# Patient Record
Sex: FEMALE | Race: WHITE | NOT HISPANIC OR LATINO
[De-identification: names, ages, dates, MRNs, and addresses within clinical notes are randomized per-mention and may not be internally consistent; named-entity substitution may affect disease eponyms.]

---

## 2017-01-13 ENCOUNTER — APPOINTMENT (OUTPATIENT)
Dept: HOME HEALTH SERVICES | Facility: HOME HEALTH | Age: 82
End: 2017-01-13

## 2017-01-24 ENCOUNTER — LABORATORY RESULT (OUTPATIENT)
Age: 82
End: 2017-01-24

## 2017-02-06 ENCOUNTER — MEDICATION RENEWAL (OUTPATIENT)
Age: 82
End: 2017-02-06

## 2017-02-08 ENCOUNTER — OTHER (OUTPATIENT)
Age: 82
End: 2017-02-08

## 2017-02-08 ENCOUNTER — CLINICAL ADVICE (OUTPATIENT)
Age: 82
End: 2017-02-08

## 2017-02-23 ENCOUNTER — APPOINTMENT (OUTPATIENT)
Dept: HOME HEALTH SERVICES | Facility: HOME HEALTH | Age: 82
End: 2017-02-23

## 2017-02-27 ENCOUNTER — OTHER (OUTPATIENT)
Age: 82
End: 2017-02-27

## 2017-02-27 ENCOUNTER — LABORATORY RESULT (OUTPATIENT)
Age: 82
End: 2017-02-27

## 2017-02-27 ENCOUNTER — APPOINTMENT (OUTPATIENT)
Dept: HOME HEALTH SERVICES | Facility: HOME HEALTH | Age: 82
End: 2017-02-27

## 2017-02-27 VITALS
OXYGEN SATURATION: 96 % | TEMPERATURE: 96.9 F | DIASTOLIC BLOOD PRESSURE: 80 MMHG | HEART RATE: 87 BPM | SYSTOLIC BLOOD PRESSURE: 120 MMHG | RESPIRATION RATE: 17 BRPM

## 2017-03-23 ENCOUNTER — RX RENEWAL (OUTPATIENT)
Age: 82
End: 2017-03-23

## 2017-03-27 ENCOUNTER — APPOINTMENT (OUTPATIENT)
Dept: HOME HEALTH SERVICES | Facility: HOME HEALTH | Age: 82
End: 2017-03-27

## 2017-03-27 LAB
INR PPP: 2.23 RATIO
PT BLD: 25.6 SEC

## 2017-04-25 LAB
INR PPP: 2.31 RATIO
PT BLD: 26.5 SEC

## 2017-05-08 ENCOUNTER — APPOINTMENT (OUTPATIENT)
Dept: HOME HEALTH SERVICES | Facility: HOME HEALTH | Age: 82
End: 2017-05-08
Payer: MEDICARE

## 2017-05-08 VITALS
DIASTOLIC BLOOD PRESSURE: 70 MMHG | TEMPERATURE: 97.1 F | SYSTOLIC BLOOD PRESSURE: 110 MMHG | HEART RATE: 71 BPM | RESPIRATION RATE: 18 BRPM | OXYGEN SATURATION: 97 %

## 2017-05-08 DIAGNOSIS — Z71.89 OTHER SPECIFIED COUNSELING: ICD-10-CM

## 2017-05-08 DIAGNOSIS — Z87.39 PERSONAL HISTORY OF OTHER DISEASES OF THE MUSCULOSKELETAL SYSTEM AND CONNECTIVE TISSUE: ICD-10-CM

## 2017-05-08 DIAGNOSIS — R10.9 UNSPECIFIED ABDOMINAL PAIN: ICD-10-CM

## 2017-05-08 PROCEDURE — 99350 HOME/RES VST EST HIGH MDM 60: CPT | Mod: GV

## 2017-05-08 RX ORDER — OXYCODONE AND ACETAMINOPHEN 5; 325 MG/1; MG/1
5-325 TABLET ORAL
Qty: 40 | Refills: 0 | Status: COMPLETED | COMMUNITY
Start: 2017-02-06 | End: 2017-05-08

## 2017-05-19 ENCOUNTER — APPOINTMENT (OUTPATIENT)
Dept: CT IMAGING | Facility: CLINIC | Age: 82
End: 2017-05-19

## 2017-05-19 ENCOUNTER — OUTPATIENT (OUTPATIENT)
Dept: OUTPATIENT SERVICES | Facility: HOSPITAL | Age: 82
LOS: 1 days | End: 2017-05-19
Payer: COMMERCIAL

## 2017-05-19 DIAGNOSIS — Z00.8 ENCOUNTER FOR OTHER GENERAL EXAMINATION: ICD-10-CM

## 2017-05-19 PROCEDURE — 71260 CT THORAX DX C+: CPT

## 2017-05-19 PROCEDURE — 74177 CT ABD & PELVIS W/CONTRAST: CPT

## 2017-05-23 ENCOUNTER — APPOINTMENT (OUTPATIENT)
Dept: HOME HEALTH SERVICES | Facility: HOME HEALTH | Age: 82
End: 2017-05-23

## 2017-05-25 ENCOUNTER — RX RENEWAL (OUTPATIENT)
Age: 82
End: 2017-05-25

## 2017-05-26 LAB
INR PPP: 1.99 RATIO
PT BLD: 22.8 SEC

## 2017-06-23 LAB
INR PPP: 1.9 RATIO
PT BLD: 21.8 SEC

## 2017-07-05 ENCOUNTER — OTHER (OUTPATIENT)
Age: 82
End: 2017-07-05

## 2017-07-18 ENCOUNTER — APPOINTMENT (OUTPATIENT)
Dept: HOME HEALTH SERVICES | Facility: HOME HEALTH | Age: 82
End: 2017-07-18
Payer: MEDICARE

## 2017-07-18 VITALS
HEART RATE: 72 BPM | OXYGEN SATURATION: 97 % | RESPIRATION RATE: 16 BRPM | SYSTOLIC BLOOD PRESSURE: 110 MMHG | TEMPERATURE: 99 F | DIASTOLIC BLOOD PRESSURE: 70 MMHG

## 2017-07-18 DIAGNOSIS — Z87.891 PERSONAL HISTORY OF NICOTINE DEPENDENCE: ICD-10-CM

## 2017-07-18 DIAGNOSIS — E55.9 VITAMIN D DEFICIENCY, UNSPECIFIED: ICD-10-CM

## 2017-07-18 DIAGNOSIS — Z78.9 OTHER SPECIFIED HEALTH STATUS: ICD-10-CM

## 2017-07-18 DIAGNOSIS — D49.1 NEOPLASM OF UNSPECIFIED BEHAVIOR OF RESPIRATORY SYSTEM: ICD-10-CM

## 2017-07-18 DIAGNOSIS — Z51.5 ENCOUNTER FOR PALLIATIVE CARE: ICD-10-CM

## 2017-07-18 PROCEDURE — 99350 HOME/RES VST EST HIGH MDM 60: CPT | Mod: GV

## 2017-07-19 LAB
INR PPP: 2.15 RATIO
PT BLD: 24.7 SEC

## 2017-07-21 ENCOUNTER — RX RENEWAL (OUTPATIENT)
Age: 82
End: 2017-07-21

## 2017-08-18 ENCOUNTER — RX RENEWAL (OUTPATIENT)
Age: 82
End: 2017-08-18

## 2017-08-22 ENCOUNTER — LABORATORY RESULT (OUTPATIENT)
Age: 82
End: 2017-08-22

## 2017-08-23 ENCOUNTER — CLINICAL ADVICE (OUTPATIENT)
Age: 82
End: 2017-08-23

## 2017-08-24 ENCOUNTER — RX RENEWAL (OUTPATIENT)
Age: 82
End: 2017-08-24

## 2017-09-14 ENCOUNTER — RX RENEWAL (OUTPATIENT)
Age: 82
End: 2017-09-14

## 2017-09-19 LAB
INR PPP: 1.74 RATIO
PT BLD: 19.9 SEC

## 2017-09-27 ENCOUNTER — APPOINTMENT (OUTPATIENT)
Dept: HOME HEALTH SERVICES | Facility: HOME HEALTH | Age: 82
End: 2017-09-27

## 2017-09-29 ENCOUNTER — APPOINTMENT (OUTPATIENT)
Dept: HOME HEALTH SERVICES | Facility: HOME HEALTH | Age: 82
End: 2017-09-29
Payer: MEDICARE

## 2017-09-29 VITALS
HEART RATE: 80 BPM | SYSTOLIC BLOOD PRESSURE: 135 MMHG | DIASTOLIC BLOOD PRESSURE: 80 MMHG | RESPIRATION RATE: 16 BRPM | OXYGEN SATURATION: 96 %

## 2017-09-29 PROCEDURE — 99350 HOME/RES VST EST HIGH MDM 60: CPT

## 2017-10-03 ENCOUNTER — MOBILE ON CALL (OUTPATIENT)
Age: 82
End: 2017-10-03

## 2017-10-17 ENCOUNTER — RX RENEWAL (OUTPATIENT)
Age: 82
End: 2017-10-17

## 2017-10-18 ENCOUNTER — RX RENEWAL (OUTPATIENT)
Age: 82
End: 2017-10-18

## 2017-11-24 ENCOUNTER — CLINICAL ADVICE (OUTPATIENT)
Age: 82
End: 2017-11-24

## 2017-11-29 ENCOUNTER — APPOINTMENT (OUTPATIENT)
Dept: HOME HEALTH SERVICES | Facility: HOME HEALTH | Age: 82
End: 2017-11-29
Payer: MEDICARE

## 2017-11-29 VITALS
OXYGEN SATURATION: 97 % | RESPIRATION RATE: 20 BRPM | DIASTOLIC BLOOD PRESSURE: 70 MMHG | SYSTOLIC BLOOD PRESSURE: 120 MMHG | HEART RATE: 77 BPM | TEMPERATURE: 98.8 F

## 2017-11-29 PROCEDURE — G0008: CPT

## 2017-11-29 PROCEDURE — 99350 HOME/RES VST EST HIGH MDM 60: CPT | Mod: 25

## 2017-11-29 PROCEDURE — 90662 IIV NO PRSV INCREASED AG IM: CPT

## 2017-12-07 ENCOUNTER — CLINICAL ADVICE (OUTPATIENT)
Age: 82
End: 2017-12-07

## 2017-12-07 LAB
ALBUMIN SERPL ELPH-MCNC: 3.6 G/DL
ANION GAP SERPL CALC-SCNC: 13 MMOL/L
BUN SERPL-MCNC: 19 MG/DL
CALCIUM SERPL-MCNC: 9.9 MG/DL
CHLORIDE SERPL-SCNC: 103 MMOL/L
CO2 SERPL-SCNC: 30 MMOL/L
CREAT SERPL-MCNC: 0.73 MG/DL
GLUCOSE SERPL-MCNC: 95 MG/DL
INR PPP: 1.75 RATIO
NT-PROBNP SERPL-MCNC: 2554 PG/ML
PHOSPHATE SERPL-MCNC: 3 MG/DL
POTASSIUM SERPL-SCNC: 4.3 MMOL/L
PT BLD: 20 SEC
SODIUM SERPL-SCNC: 146 MMOL/L
TSH SERPL-ACNC: 0.24 UIU/ML
VIT B12 SERPL-MCNC: 239 PG/ML

## 2018-01-01 ENCOUNTER — OUTPATIENT (OUTPATIENT)
Dept: OUTPATIENT SERVICES | Facility: HOSPITAL | Age: 83
LOS: 1 days | End: 2018-01-01
Payer: MEDICAID

## 2018-01-01 PROCEDURE — G9001: CPT

## 2018-01-20 ENCOUNTER — CLINICAL ADVICE (OUTPATIENT)
Age: 83
End: 2018-01-20

## 2018-01-20 ENCOUNTER — INPATIENT (INPATIENT)
Facility: HOSPITAL | Age: 83
LOS: 2 days | Discharge: HOME HEALTH | End: 2018-01-23
Attending: HOSPITALIST | Admitting: HOSPITALIST
Payer: MEDICARE

## 2018-01-20 VITALS
RESPIRATION RATE: 27 BRPM | DIASTOLIC BLOOD PRESSURE: 37 MMHG | HEART RATE: 105 BPM | OXYGEN SATURATION: 100 % | SYSTOLIC BLOOD PRESSURE: 137 MMHG | TEMPERATURE: 102 F

## 2018-01-20 DIAGNOSIS — A41.9 SEPSIS, UNSPECIFIED ORGANISM: ICD-10-CM

## 2018-01-20 DIAGNOSIS — I10 ESSENTIAL (PRIMARY) HYPERTENSION: ICD-10-CM

## 2018-01-20 DIAGNOSIS — J32.9 CHRONIC SINUSITIS, UNSPECIFIED: ICD-10-CM

## 2018-01-20 DIAGNOSIS — I48.2 CHRONIC ATRIAL FIBRILLATION: ICD-10-CM

## 2018-01-20 DIAGNOSIS — I50.9 HEART FAILURE, UNSPECIFIED: ICD-10-CM

## 2018-01-20 DIAGNOSIS — J42 UNSPECIFIED CHRONIC BRONCHITIS: ICD-10-CM

## 2018-01-20 DIAGNOSIS — E03.9 HYPOTHYROIDISM, UNSPECIFIED: ICD-10-CM

## 2018-01-20 DIAGNOSIS — B34.9 VIRAL INFECTION, UNSPECIFIED: ICD-10-CM

## 2018-01-20 LAB
ALBUMIN SERPL ELPH-MCNC: 3.8 G/DL — SIGNIFICANT CHANGE UP (ref 3.3–5)
ALP SERPL-CCNC: 60 U/L — SIGNIFICANT CHANGE UP (ref 40–120)
ALT FLD-CCNC: 8 U/L — SIGNIFICANT CHANGE UP (ref 4–33)
APPEARANCE UR: CLEAR — SIGNIFICANT CHANGE UP
APTT BLD: 34.4 SEC — SIGNIFICANT CHANGE UP (ref 27.5–37.4)
AST SERPL-CCNC: 15 U/L — SIGNIFICANT CHANGE UP (ref 4–32)
B PERT DNA SPEC QL NAA+PROBE: SIGNIFICANT CHANGE UP
BASE EXCESS BLDV CALC-SCNC: 10 MMOL/L — SIGNIFICANT CHANGE UP
BASOPHILS # BLD AUTO: 0.05 K/UL — SIGNIFICANT CHANGE UP (ref 0–0.2)
BASOPHILS NFR BLD AUTO: 0.5 % — SIGNIFICANT CHANGE UP (ref 0–2)
BILIRUB SERPL-MCNC: 0.7 MG/DL — SIGNIFICANT CHANGE UP (ref 0.2–1.2)
BILIRUB UR-MCNC: NEGATIVE — SIGNIFICANT CHANGE UP
BLOOD GAS VENOUS - CREATININE: 0.67 MG/DL — SIGNIFICANT CHANGE UP (ref 0.5–1.3)
BLOOD UR QL VISUAL: HIGH
BUN SERPL-MCNC: 20 MG/DL — SIGNIFICANT CHANGE UP (ref 7–23)
C PNEUM DNA SPEC QL NAA+PROBE: NOT DETECTED — SIGNIFICANT CHANGE UP
CALCIUM SERPL-MCNC: 9 MG/DL — SIGNIFICANT CHANGE UP (ref 8.4–10.5)
CHLORIDE BLDV-SCNC: 104 MMOL/L — SIGNIFICANT CHANGE UP (ref 96–108)
CHLORIDE SERPL-SCNC: 101 MMOL/L — SIGNIFICANT CHANGE UP (ref 98–107)
CK MB BLD-MCNC: 1.71 NG/ML — SIGNIFICANT CHANGE UP (ref 1–4.7)
CK SERPL-CCNC: 40 U/L — SIGNIFICANT CHANGE UP (ref 25–170)
CO2 SERPL-SCNC: 32 MMOL/L — HIGH (ref 22–31)
COLOR SPEC: SIGNIFICANT CHANGE UP
CREAT SERPL-MCNC: 0.73 MG/DL — SIGNIFICANT CHANGE UP (ref 0.5–1.3)
DIGOXIN SERPL-MCNC: < 0.3 NG/ML — LOW (ref 0.8–2)
EOSINOPHIL # BLD AUTO: 0.03 K/UL — SIGNIFICANT CHANGE UP (ref 0–0.5)
EOSINOPHIL NFR BLD AUTO: 0.3 % — SIGNIFICANT CHANGE UP (ref 0–6)
FLUAV H1 2009 PAND RNA SPEC QL NAA+PROBE: NOT DETECTED — SIGNIFICANT CHANGE UP
FLUAV H1 RNA SPEC QL NAA+PROBE: NOT DETECTED — SIGNIFICANT CHANGE UP
FLUAV H3 RNA SPEC QL NAA+PROBE: NOT DETECTED — SIGNIFICANT CHANGE UP
FLUAV SUBTYP SPEC NAA+PROBE: SIGNIFICANT CHANGE UP
FLUBV RNA SPEC QL NAA+PROBE: NOT DETECTED — SIGNIFICANT CHANGE UP
GAS PNL BLDV: 134 MMOL/L — LOW (ref 136–146)
GLUCOSE BLDV-MCNC: 112 — HIGH (ref 70–99)
GLUCOSE SERPL-MCNC: 105 MG/DL — HIGH (ref 70–99)
GLUCOSE UR-MCNC: NEGATIVE — SIGNIFICANT CHANGE UP
HADV DNA SPEC QL NAA+PROBE: NOT DETECTED — SIGNIFICANT CHANGE UP
HCO3 BLDV-SCNC: 31 MMOL/L — HIGH (ref 20–27)
HCOV 229E RNA SPEC QL NAA+PROBE: NOT DETECTED — SIGNIFICANT CHANGE UP
HCOV HKU1 RNA SPEC QL NAA+PROBE: NOT DETECTED — SIGNIFICANT CHANGE UP
HCOV NL63 RNA SPEC QL NAA+PROBE: NOT DETECTED — SIGNIFICANT CHANGE UP
HCOV OC43 RNA SPEC QL NAA+PROBE: NOT DETECTED — SIGNIFICANT CHANGE UP
HCT VFR BLD CALC: 39.8 % — SIGNIFICANT CHANGE UP (ref 34.5–45)
HCT VFR BLDV CALC: 39.4 % — SIGNIFICANT CHANGE UP (ref 34.5–45)
HGB BLD-MCNC: 12.7 G/DL — SIGNIFICANT CHANGE UP (ref 11.5–15.5)
HGB BLDV-MCNC: 12.8 G/DL — SIGNIFICANT CHANGE UP (ref 11.5–15.5)
HMPV RNA SPEC QL NAA+PROBE: NOT DETECTED — SIGNIFICANT CHANGE UP
HPIV1 RNA SPEC QL NAA+PROBE: NOT DETECTED — SIGNIFICANT CHANGE UP
HPIV2 RNA SPEC QL NAA+PROBE: NOT DETECTED — SIGNIFICANT CHANGE UP
HPIV3 RNA SPEC QL NAA+PROBE: NOT DETECTED — SIGNIFICANT CHANGE UP
HPIV4 RNA SPEC QL NAA+PROBE: NOT DETECTED — SIGNIFICANT CHANGE UP
HYALINE CASTS # UR AUTO: SIGNIFICANT CHANGE UP (ref 0–?)
IMM GRANULOCYTES # BLD AUTO: 0.03 # — SIGNIFICANT CHANGE UP
IMM GRANULOCYTES NFR BLD AUTO: 0.3 % — SIGNIFICANT CHANGE UP (ref 0–1.5)
INR BLD: 1.84 — HIGH (ref 0.88–1.17)
KETONES UR-MCNC: NEGATIVE — SIGNIFICANT CHANGE UP
LACTATE BLDV-MCNC: 1.5 MMOL/L — SIGNIFICANT CHANGE UP (ref 0.5–2)
LEUKOCYTE ESTERASE UR-ACNC: HIGH
LYMPHOCYTES # BLD AUTO: 0.56 K/UL — LOW (ref 1–3.3)
LYMPHOCYTES # BLD AUTO: 6.1 % — LOW (ref 13–44)
M PNEUMO DNA SPEC QL NAA+PROBE: NOT DETECTED — SIGNIFICANT CHANGE UP
MCHC RBC-ENTMCNC: 30.1 PG — SIGNIFICANT CHANGE UP (ref 27–34)
MCHC RBC-ENTMCNC: 31.9 % — LOW (ref 32–36)
MCV RBC AUTO: 94.3 FL — SIGNIFICANT CHANGE UP (ref 80–100)
MONOCYTES # BLD AUTO: 0.44 K/UL — SIGNIFICANT CHANGE UP (ref 0–0.9)
MONOCYTES NFR BLD AUTO: 4.8 % — SIGNIFICANT CHANGE UP (ref 2–14)
NEUTROPHILS # BLD AUTO: 8.03 K/UL — HIGH (ref 1.8–7.4)
NEUTROPHILS NFR BLD AUTO: 88 % — HIGH (ref 43–77)
NITRITE UR-MCNC: NEGATIVE — SIGNIFICANT CHANGE UP
NRBC # FLD: 0 — SIGNIFICANT CHANGE UP
PCO2 BLDV: 61 MMHG — HIGH (ref 41–51)
PH BLDV: 7.38 PH — SIGNIFICANT CHANGE UP (ref 7.32–7.43)
PH UR: 5.5 — SIGNIFICANT CHANGE UP (ref 4.6–8)
PLATELET # BLD AUTO: 206 K/UL — SIGNIFICANT CHANGE UP (ref 150–400)
PMV BLD: 9.8 FL — SIGNIFICANT CHANGE UP (ref 7–13)
PO2 BLDV: 28 MMHG — LOW (ref 35–40)
POTASSIUM BLDV-SCNC: 5.9 MMOL/L — HIGH (ref 3.4–4.5)
POTASSIUM SERPL-MCNC: 3.9 MMOL/L — SIGNIFICANT CHANGE UP (ref 3.5–5.3)
POTASSIUM SERPL-SCNC: 3.9 MMOL/L — SIGNIFICANT CHANGE UP (ref 3.5–5.3)
PROT SERPL-MCNC: 7.1 G/DL — SIGNIFICANT CHANGE UP (ref 6–8.3)
PROT UR-MCNC: NEGATIVE MG/DL — SIGNIFICANT CHANGE UP
PROTHROM AB SERPL-ACNC: 20.7 SEC — HIGH (ref 9.8–13.1)
RBC # BLD: 4.22 M/UL — SIGNIFICANT CHANGE UP (ref 3.8–5.2)
RBC # FLD: 13.6 % — SIGNIFICANT CHANGE UP (ref 10.3–14.5)
RBC CASTS # UR COMP ASSIST: SIGNIFICANT CHANGE UP (ref 0–?)
RSV RNA SPEC QL NAA+PROBE: NOT DETECTED — SIGNIFICANT CHANGE UP
RV+EV RNA SPEC QL NAA+PROBE: NOT DETECTED — SIGNIFICANT CHANGE UP
SAO2 % BLDV: 43.6 % — LOW (ref 60–85)
SODIUM SERPL-SCNC: 144 MMOL/L — SIGNIFICANT CHANGE UP (ref 135–145)
SP GR SPEC: 1.01 — SIGNIFICANT CHANGE UP (ref 1–1.04)
SQUAMOUS # UR AUTO: SIGNIFICANT CHANGE UP
TROPONIN T SERPL-MCNC: < 0.06 NG/ML — SIGNIFICANT CHANGE UP (ref 0–0.06)
UROBILINOGEN FLD QL: NORMAL MG/DL — SIGNIFICANT CHANGE UP
WBC # BLD: 9.14 K/UL — SIGNIFICANT CHANGE UP (ref 3.8–10.5)
WBC # FLD AUTO: 9.14 K/UL — SIGNIFICANT CHANGE UP (ref 3.8–10.5)
WBC UR QL: SIGNIFICANT CHANGE UP (ref 0–?)

## 2018-01-20 PROCEDURE — 99223 1ST HOSP IP/OBS HIGH 75: CPT

## 2018-01-20 PROCEDURE — 71045 X-RAY EXAM CHEST 1 VIEW: CPT | Mod: 26

## 2018-01-20 RX ORDER — ACETAMINOPHEN 500 MG
650 TABLET ORAL EVERY 6 HOURS
Qty: 0 | Refills: 0 | Status: DISCONTINUED | OUTPATIENT
Start: 2018-01-20 | End: 2018-01-23

## 2018-01-20 RX ORDER — IPRATROPIUM/ALBUTEROL SULFATE 18-103MCG
3 AEROSOL WITH ADAPTER (GRAM) INHALATION EVERY 6 HOURS
Qty: 0 | Refills: 0 | Status: DISCONTINUED | OUTPATIENT
Start: 2018-01-20 | End: 2018-01-23

## 2018-01-20 RX ORDER — WARFARIN SODIUM 2.5 MG/1
1 TABLET ORAL
Qty: 0 | Refills: 0 | COMMUNITY

## 2018-01-20 RX ORDER — ATENOLOL 25 MG/1
50 TABLET ORAL DAILY
Qty: 0 | Refills: 0 | Status: DISCONTINUED | OUTPATIENT
Start: 2018-01-20 | End: 2018-01-23

## 2018-01-20 RX ORDER — FUROSEMIDE 40 MG
20 TABLET ORAL DAILY
Qty: 0 | Refills: 0 | Status: DISCONTINUED | OUTPATIENT
Start: 2018-01-20 | End: 2018-01-23

## 2018-01-20 RX ORDER — SODIUM CHLORIDE 9 MG/ML
1000 INJECTION INTRAMUSCULAR; INTRAVENOUS; SUBCUTANEOUS ONCE
Qty: 0 | Refills: 0 | Status: COMPLETED | OUTPATIENT
Start: 2018-01-20 | End: 2018-01-20

## 2018-01-20 RX ORDER — AZITHROMYCIN 500 MG/1
500 TABLET, FILM COATED ORAL ONCE
Qty: 0 | Refills: 0 | Status: COMPLETED | OUTPATIENT
Start: 2018-01-21 | End: 2018-01-21

## 2018-01-20 RX ORDER — FLUTICASONE PROPIONATE 50 MCG
2 SPRAY, SUSPENSION NASAL DAILY
Qty: 0 | Refills: 0 | Status: DISCONTINUED | OUTPATIENT
Start: 2018-01-20 | End: 2018-01-23

## 2018-01-20 RX ORDER — PANTOPRAZOLE SODIUM 20 MG/1
40 TABLET, DELAYED RELEASE ORAL
Qty: 0 | Refills: 0 | Status: DISCONTINUED | OUTPATIENT
Start: 2018-01-20 | End: 2018-01-23

## 2018-01-20 RX ORDER — CEFTRIAXONE 500 MG/1
1 INJECTION, POWDER, FOR SOLUTION INTRAMUSCULAR; INTRAVENOUS ONCE
Qty: 0 | Refills: 0 | Status: COMPLETED | OUTPATIENT
Start: 2018-01-20 | End: 2018-01-20

## 2018-01-20 RX ORDER — ACETAMINOPHEN 500 MG
1000 TABLET ORAL ONCE
Qty: 0 | Refills: 0 | Status: COMPLETED | OUTPATIENT
Start: 2018-01-20 | End: 2018-01-20

## 2018-01-20 RX ORDER — TIOTROPIUM BROMIDE 18 UG/1
1 CAPSULE ORAL; RESPIRATORY (INHALATION) DAILY
Qty: 0 | Refills: 0 | Status: DISCONTINUED | OUTPATIENT
Start: 2018-01-20 | End: 2018-01-23

## 2018-01-20 RX ORDER — LEVOTHYROXINE SODIUM 125 MCG
112 TABLET ORAL DAILY
Qty: 0 | Refills: 0 | Status: DISCONTINUED | OUTPATIENT
Start: 2018-01-20 | End: 2018-01-23

## 2018-01-20 RX ORDER — INFLUENZA VIRUS VACCINE 15; 15; 15; 15 UG/.5ML; UG/.5ML; UG/.5ML; UG/.5ML
0.5 SUSPENSION INTRAMUSCULAR ONCE
Qty: 0 | Refills: 0 | Status: COMPLETED | OUTPATIENT
Start: 2018-01-20 | End: 2018-01-22

## 2018-01-20 RX ORDER — CEFTRIAXONE 500 MG/1
1 INJECTION, POWDER, FOR SOLUTION INTRAMUSCULAR; INTRAVENOUS ONCE
Qty: 0 | Refills: 0 | Status: COMPLETED | OUTPATIENT
Start: 2018-01-21 | End: 2018-01-21

## 2018-01-20 RX ORDER — PREGABALIN 225 MG/1
1000 CAPSULE ORAL DAILY
Qty: 0 | Refills: 0 | Status: DISCONTINUED | OUTPATIENT
Start: 2018-01-20 | End: 2018-01-23

## 2018-01-20 RX ORDER — IPRATROPIUM/ALBUTEROL SULFATE 18-103MCG
3 AEROSOL WITH ADAPTER (GRAM) INHALATION ONCE
Qty: 0 | Refills: 0 | Status: COMPLETED | OUTPATIENT
Start: 2018-01-20 | End: 2018-01-20

## 2018-01-20 RX ORDER — AZITHROMYCIN 500 MG/1
500 TABLET, FILM COATED ORAL ONCE
Qty: 0 | Refills: 0 | Status: COMPLETED | OUTPATIENT
Start: 2018-01-20 | End: 2018-01-20

## 2018-01-20 RX ADMIN — AZITHROMYCIN 250 MILLIGRAM(S): 500 TABLET, FILM COATED ORAL at 16:00

## 2018-01-20 RX ADMIN — Medication 40 MILLIGRAM(S): at 16:06

## 2018-01-20 RX ADMIN — Medication 400 MILLIGRAM(S): at 15:05

## 2018-01-20 RX ADMIN — CEFTRIAXONE 100 GRAM(S): 500 INJECTION, POWDER, FOR SOLUTION INTRAMUSCULAR; INTRAVENOUS at 17:47

## 2018-01-20 RX ADMIN — TIOTROPIUM BROMIDE 1 CAPSULE(S): 18 CAPSULE ORAL; RESPIRATORY (INHALATION) at 22:57

## 2018-01-20 RX ADMIN — Medication 3 MILLILITER(S): at 15:02

## 2018-01-20 RX ADMIN — SODIUM CHLORIDE 1000 MILLILITER(S): 9 INJECTION INTRAMUSCULAR; INTRAVENOUS; SUBCUTANEOUS at 15:02

## 2018-01-20 NOTE — ED ADULT NURSE NOTE - OBJECTIVE STATEMENT
Pt. A&Ox3, c/o SOB with cough over the past few weeks progressively becoming worse. Pt. lives at home with 24hr aide. Arrives on 6L NC for O2 sat in high 70s on RA. Placed pt. on 3L NC in room and sating >95%. Rectal temp of 101.6. Respirations even and unlabored. Skin intact. #20g IV placed in right AC, sepsis labs sent. Arrived with #22g IV in left hand by EMS. Given 1L NS bolus and IV tylenol. MD at bedside for eval. Afib on EKG and monitor. Will continue to monitor.

## 2018-01-20 NOTE — H&P ADULT - PROBLEM SELECTOR PLAN 5
unclear is systolic or diastolic  Will obtain TTE while inpt ( should not hold up discharge)  c/w Lasix

## 2018-01-20 NOTE — ED ADULT TRIAGE NOTE - CHIEF COMPLAINT QUOTE
brought in by EMS as notification for respiratory distress. Called in as Afib [id number], left sided lung diminished, RA O2 at 90%, placed on 6L at 93%.

## 2018-01-20 NOTE — H&P ADULT - PROBLEM SELECTOR PLAN 1
sepsis (Tachypnea, Fever, tachycardia) likely due to left retrocardiac PNA  Viral panel negative  c/w ceftriaxone and azithromycin sepsis (Tachypnea, Fever, tachycardia) likely due to left retrocardiac PNA  Viral panel negative  c/w ceftriaxone and azithromycin  f/u BCx

## 2018-01-20 NOTE — H&P ADULT - NSHPLABSRESULTS_GEN_ALL_CORE
12.7   9.14  )-----------( 206      ( 2018 14:17 )             39.8       144  |  101  |  20  ----------------------------<  105<H>  3.9   |  32<H>  |  0.73    Ca    9.0      2018 14:17    TPro  7.1  /  Alb  3.8  /  TBili  0.7  /  DBili  x   /  AST  15  /  ALT  8   /  AlkPhos  60    Urinalysis Basic - ( 2018 15:40 )    Color: COLORL / Appearance: CLEAR / S.009 / pH: 5.5  Gluc: NEGATIVE / Ketone: NEGATIVE  / Bili: NEGATIVE / Urobili: NORMAL mg/dL   Blood: MODERATE / Protein: NEGATIVE mg/dL / Nitrite: NEGATIVE   Leuk Esterase: MODERATE / RBC: 10-25 / WBC 10-25   Sq Epi: OCC / Non Sq Epi: x / Bacteria: x

## 2018-01-20 NOTE — H&P ADULT - ASSESSMENT
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 Pt found to have Sepsis 2/2 PNA.

## 2018-01-20 NOTE — H&P ADULT - HISTORY OF PRESENT ILLNESS
Pt is a poor historian, collateral obtained from grandson at bedside.   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 Pt's grandson notes SOB over past week, worsening today with productive cough, subjective fever, chills, nausea, and malaise. Per Allscript reports, House call doctor was called to evaluate pt, at the time Vitals 171/101 119 32 90% on RA, pt was advised to come to the ED. Pulse Ox outpt 96-97%.  Pt denies dysphagia, dizziness or vomiting.   Pt is homebound due to dementia, gait instability, and ambulates with cane for short distances. Per son ( HCP via phone),  pt refuses to take Spiriva at home. Per allscript, pt skip Coumadin at times. Poor dietary compliance for CHF (eats sausage, Chinese food and bruce)  Patient's son is her proxy, lives in [place]:  Teodoro Gonsalez (cell) [phone] / (home) [phone] ([place], [place])    In the ED  Vitals; 137/37 105 101.6 27 100% on Oxygen  Pt received Ceftriaxone, azithromycin, Tylenol, duoneb, prednisone Pt is a poor historian, collateral obtained from grandson at bedside.   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 Pt's grandson notes SOB over past week, worsening today with productive cough, subjective fever, chills, nausea, and malaise. Per All[employer] reports, House call doctor was called to evaluate pt, at the time Vitals 171/101 119 32 90% on RA, pt was advised to come to the ED. Pulse Ox outpt 96-97%.  Pt denies dysphagia, dysuria, polyuria,  dizziness or vomiting.   Pt is homebound due to dementia, gait instability, and ambulates with cane for short distances. Per son ( HCP via phone),  pt refuses to take Spiriva at home. Per allscript, pt skip Coumadin at times. Poor dietary compliance for CHF (eats sausage, Chinese food and bruce)  Patient's son is her proxy, lives in [place]:  Teodoro Wallace (cell) [phone] / (home) [phone] ([place], [place])    In the ED  Vitals; 137/37 105 101.6 27 100% on Oxygen  Pt received Ceftriaxone, azithromycin, Tylenol, duoneb, prednisone

## 2018-01-20 NOTE — ED PROVIDER NOTE - MEDICAL DECISION MAKING DETAILS
88F w/ above history p/w SOB, cough, subj F/C, concerning for PNA  -labs, ekg, xr, abx 88F w/ above history p/w SOB, cough, subj F/C, concerning for PNA vs flu/viral syndrome. Would send labs, RVP ekg, xr, abx.  Admit.

## 2018-01-20 NOTE — H&P ADULT - NSHPPHYSICALEXAM_GEN_ALL_CORE
GENERAL: NAD, well-developed  HEAD:  Atraumatic, Normocephalic  EYES: EOMI, PERRLA, conjunctiva and sclera clear  MOUTH: +dentures, moist mucus membrane  NECK: Supple, No JVD  CHEST/LUNG: bibasilar rhonchi ( poor effort  HEART: +si +s2 irregular rate  ABDOMEN: Soft, Nontender, Nondistended; Bowel sounds present  EXTREMITIES:  2+ Peripheral Pulses, No clubbing, cyanosis, or edema  PSYCH: AAOx2 ( self and place)  NEUROLOGY: non-focal  SKIN: No rashes or lesions GENERAL: NAD, thin  HEAD:  Atraumatic, Normocephalic  EYES: EOMI, PERRLA, conjunctiva and sclera clear  MOUTH: +dentures, moist mucus membrane  NECK: Supple, No JVD  CHEST/LUNG: bibasilar rhonchi ( poor effort  HEART: +si +s2 irregular rate  ABDOMEN: Soft, Nontender, Nondistended; Bowel sounds present +ventral hernia  EXTREMITIES:  2+ Peripheral Pulses, No clubbing, cyanosis, or edema  PSYCH: AAOx2 ( self and place)  NEUROLOGY: non-focal  SKIN: No rashes or lesions

## 2018-01-20 NOTE — ED ADULT NURSE NOTE - CHIEF COMPLAINT QUOTE
brought in by EMS as notification for respiratory distress. Called in as Afib [id number], left sided lung diminished, RA O2 at 90%, placed on 6L at 93%.

## 2018-01-20 NOTE — ED PROVIDER NOTE - ATTENDING CONTRIBUTION TO CARE
Attending Attestation: Dr. Batista  I have personally performed a history and physical examination of the patient and discussed management with the resident as well as the patient.  I reviewed the resident's note and agree with the documented findings and plan of care.  I have authored and modified critical sections of the Provider Note, including but not limited to HPI, Physical Exam and MDM. 88F w/ above history p/w SOB, cough, subj F/C, concerning for PNA vs flu/viral syndrome. Would send labs, RVP ekg, xr, abx.  Admit.

## 2018-01-20 NOTE — H&P ADULT - NSHPREVIEWOFSYSTEMS_GEN_ALL_CORE
CONSTITUTIONAL: +fever, + fatigue  EYES: No eye pain, visual disturbances, or discharge  ENMT:  No difficulty hearing, tinnitus, vertigo; No sinus or throat pain  NECK: No pain or stiffness  BREASTS: No pain, masses, or nipple discharge  RESPIRATORY: +cough, + shortness of breath  CARDIOVASCULAR: No chest pain, palpitations, dizziness, or leg swelling  GASTROINTESTINAL: +nausea, No abdominal or epigastric pain. No vomiting, or hematemesis; No diarrhea or constipation. No melena or hematochezia.  GENITOURINARY: No dysuria, frequency, hematuria, or incontinence  NEUROLOGICAL: poor memory  SKIN: No itching, burning, rashes, or lesions   ENDOCRINE: No heat or cold intolerance; No hair loss  MUSCULOSKELETAL: No joint pain or swelling; No muscle, back, or extremity pain  PSYCHIATRIC: No depression, anxiety, mood swings, or difficulty sleeping  HEME/LYMPH: No easy bruising, or bleeding gums

## 2018-01-20 NOTE — ED PROVIDER NOTE - OBJECTIVE STATEMENT
88F h/o paroxysmal AFib, HTN, HLD, pulm HTN, RLL mass, hypothyroid presenting with difficulty breathing. Pt's grandson notes SOB over past week, worsening today with productive cough, subjective fever, chills, nausea, and malaise, EMS arrived, found her to be hypoxic to 90 on RA, tachypneic to low 30s. 88F h/o paroxysmal AFib, HTN, HLD, pulm HTN, RLL mass, hypothyroid presenting with difficulty breathing. Pt's grandson notes SOB over past week, worsening today with productive cough, subjective fever, chills, nausea, and malaise, EMS arrived, found her to be hypoxic to 90 on RA, tachypneic to low 30s.    House call PMD: Elvin Montiel 88F h/o paroxysmal AFib, HTN, HLD, pulm HTN, RLL mass, hypothyroid presenting with difficulty breathing, BIBEMS as notification. Pt's grandson notes SOB over past week, worsening today with productive cough, subjective fever, chills, nausea, and malaise, EMS arrived, found her to be hypoxic to 90 on RA, tachypneic to low 30s. EMS states pt reported she was recent;ly in hospital.  Grandson at bedside reports last hospitalization several yrs ago.     House call PMD: Elvin Montiel 88F h/o paroxysmal AFib, HTN, HLD, pulm HTN, RLL mass, hypothyroid presenting with difficulty breathing, BIBEMS as notification. Pt's grandson notes SOB over past week, worsening today with productive cough, subjective fever, chills, nausea, and malaise, EMS arrived, found her to be hypoxic to 90 on RA, tachypneic to low 30s. EMS states pt reported she was recently in hospital.  Grandson at bedside reports last hospitalization several yrs ago.     House call PMD: Elvin Montiel    (son) Teodoro Hallstein (cell) [phone] / (home) [phone] ([place], [place]) 88F h/o paroxysmal AFib, HTN, HLD, pulm HTN, RLL mass, hypothyroid presenting with difficulty breathing, BIBEMS as notification. Pt's grandson notes SOB over past week, worsening today with productive cough, subjective fever, chills, nausea, and malaise, EMS arrived, found her to be hypoxic to 90 on RA, tachypneic to low 30s. EMS states pt reported she was recently in hospital.  Grandson at bedside reports last hospitalization several yrs ago.     Patient's son is her proxy, lives in [place]:  Teodoro Gonsalez (cell) [phone] / (home) [phone] ([place], [place])     House call PMD: Elvin Montiel 88F h/o paroxysmal AFib, HTN, HLD, pulm HTN, RLL mass, hypothyroid presenting with difficulty breathing, BIBEMS as notification. Pt's grandson notes SOB over past week, worsening today with productive cough, subjective fever, chills, nausea, and malaise, EMS arrived, found her to be hypoxic to 90 on RA, tachypneic to low 30s. EMS states pt reported she was recently in hospital.  Grandson at bedside reports last hospitalization several yrs ago.     Patient's son is her proxy, lives in [place]:  Teodoro Gonsalez (cell) [phone] / (home) [phone] ([place], [place])   Grandson (in [place]): Blaine Wallace [phone]    House call PMD: Elvin Montiel

## 2018-01-21 DIAGNOSIS — Z29.9 ENCOUNTER FOR PROPHYLACTIC MEASURES, UNSPECIFIED: ICD-10-CM

## 2018-01-21 LAB
BACTERIA UR CULT: SIGNIFICANT CHANGE UP
BUN SERPL-MCNC: 26 MG/DL — HIGH (ref 7–23)
CALCIUM SERPL-MCNC: 8.7 MG/DL — SIGNIFICANT CHANGE UP (ref 8.4–10.5)
CHLORIDE SERPL-SCNC: 102 MMOL/L — SIGNIFICANT CHANGE UP (ref 98–107)
CO2 SERPL-SCNC: 26 MMOL/L — SIGNIFICANT CHANGE UP (ref 22–31)
CREAT SERPL-MCNC: 1.01 MG/DL — SIGNIFICANT CHANGE UP (ref 0.5–1.3)
GLUCOSE SERPL-MCNC: 135 MG/DL — HIGH (ref 70–99)
HCT VFR BLD CALC: 37.5 % — SIGNIFICANT CHANGE UP (ref 34.5–45)
HGB BLD-MCNC: 12.3 G/DL — SIGNIFICANT CHANGE UP (ref 11.5–15.5)
INR BLD: 1.96 — HIGH (ref 0.88–1.17)
MCHC RBC-ENTMCNC: 31.4 PG — SIGNIFICANT CHANGE UP (ref 27–34)
MCHC RBC-ENTMCNC: 32.8 % — SIGNIFICANT CHANGE UP (ref 32–36)
MCV RBC AUTO: 95.7 FL — SIGNIFICANT CHANGE UP (ref 80–100)
NRBC # FLD: 0 — SIGNIFICANT CHANGE UP
PLATELET # BLD AUTO: 189 K/UL — SIGNIFICANT CHANGE UP (ref 150–400)
PMV BLD: 10.4 FL — SIGNIFICANT CHANGE UP (ref 7–13)
POTASSIUM SERPL-MCNC: 4 MMOL/L — SIGNIFICANT CHANGE UP (ref 3.5–5.3)
POTASSIUM SERPL-SCNC: 4 MMOL/L — SIGNIFICANT CHANGE UP (ref 3.5–5.3)
PROTHROM AB SERPL-ACNC: 22.1 SEC — HIGH (ref 9.8–13.1)
RBC # BLD: 3.92 M/UL — SIGNIFICANT CHANGE UP (ref 3.8–5.2)
RBC # FLD: 13.8 % — SIGNIFICANT CHANGE UP (ref 10.3–14.5)
SODIUM SERPL-SCNC: 143 MMOL/L — SIGNIFICANT CHANGE UP (ref 135–145)
SPECIMEN SOURCE: SIGNIFICANT CHANGE UP
WBC # BLD: 9.06 K/UL — SIGNIFICANT CHANGE UP (ref 3.8–10.5)
WBC # FLD AUTO: 9.06 K/UL — SIGNIFICANT CHANGE UP (ref 3.8–10.5)

## 2018-01-21 PROCEDURE — 99233 SBSQ HOSP IP/OBS HIGH 50: CPT | Mod: GC

## 2018-01-21 RX ORDER — WARFARIN SODIUM 2.5 MG/1
2 TABLET ORAL ONCE
Qty: 0 | Refills: 0 | Status: DISCONTINUED | OUTPATIENT
Start: 2018-01-21 | End: 2018-01-21

## 2018-01-21 RX ORDER — WARFARIN SODIUM 2.5 MG/1
1.5 TABLET ORAL ONCE
Qty: 0 | Refills: 0 | Status: COMPLETED | OUTPATIENT
Start: 2018-01-21 | End: 2018-01-21

## 2018-01-21 RX ADMIN — ATENOLOL 50 MILLIGRAM(S): 25 TABLET ORAL at 05:45

## 2018-01-21 RX ADMIN — Medication 2 SPRAY(S): at 12:07

## 2018-01-21 RX ADMIN — PANTOPRAZOLE SODIUM 40 MILLIGRAM(S): 20 TABLET, DELAYED RELEASE ORAL at 05:45

## 2018-01-21 RX ADMIN — AZITHROMYCIN 250 MILLIGRAM(S): 500 TABLET, FILM COATED ORAL at 05:45

## 2018-01-21 RX ADMIN — TIOTROPIUM BROMIDE 1 CAPSULE(S): 18 CAPSULE ORAL; RESPIRATORY (INHALATION) at 11:11

## 2018-01-21 RX ADMIN — Medication 112 MICROGRAM(S): at 05:45

## 2018-01-21 RX ADMIN — CEFTRIAXONE 100 GRAM(S): 500 INJECTION, POWDER, FOR SOLUTION INTRAMUSCULAR; INTRAVENOUS at 05:46

## 2018-01-21 RX ADMIN — PREGABALIN 1000 MICROGRAM(S): 225 CAPSULE ORAL at 12:07

## 2018-01-21 RX ADMIN — Medication 20 MILLIGRAM(S): at 05:44

## 2018-01-21 RX ADMIN — WARFARIN SODIUM 1.5 MILLIGRAM(S): 2.5 TABLET ORAL at 17:45

## 2018-01-21 NOTE — PHYSICAL THERAPY INITIAL EVALUATION ADULT - PERTINENT HX OF CURRENT PROBLEM, REHAB EVAL
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

## 2018-01-21 NOTE — PROGRESS NOTE ADULT - SUBJECTIVE AND OBJECTIVE BOX
Patient is a 88y old  Female who presents with a chief complaint of SOB (2018 17:55)      SUBJECTIVE / OVERNIGHT EVENTS:    MEDICATIONS  (STANDING):  ATENolol  Tablet 50 milliGRAM(s) Oral daily  cyanocobalamin 1000 MICROGram(s) Oral daily  fluticasone propionate 50 MICROgram(s)/spray Nasal Spray 2 Spray(s) Both Nostrils daily  furosemide    Tablet 20 milliGRAM(s) Oral daily  influenza   Vaccine 0.5 milliLiter(s) IntraMuscular once  levothyroxine 112 MICROGram(s) Oral daily  pantoprazole    Tablet 40 milliGRAM(s) Oral before breakfast  tiotropium 18 MICROgram(s) Capsule 1 Capsule(s) Inhalation daily    MEDICATIONS  (PRN):  acetaminophen   Tablet 650 milliGRAM(s) Oral every 6 hours PRN For Temp greater than 38 C (100.4 F)  ALBUTerol/ipratropium for Nebulization 3 milliLiter(s) Nebulizer every 6 hours PRN Shortness of Breath and/or Wheezing      Vital Signs Last 24 Hrs  T(C): 36.4 (2018 05:41), Max: 38.7 (2018 14:56)  T(F): 97.6 (2018 05:41), Max: 101.6 (2018 14:56)  HR: 80 (2018 05:41) (80 - 105)  BP: 114/73 (2018 05:41) (91/51 - 137/37)  BP(mean): --  RR: 16 (2018 05:41) (16 - 27)  SpO2: 99% (2018 05:41) (97% - 100%)  CAPILLARY BLOOD GLUCOSE        I&O's Summary      PHYSICAL EXAM:  GENERAL: NAD, well-developed  HEAD:  Atraumatic, Normocephalic  EYES: EOMI, PERRLA, conjunctiva and sclera clear  NECK: Supple, No JVD  CHEST/LUNG: Clear to auscultation bilaterally; No wheeze  HEART: Regular rate and rhythm; No murmurs, rubs, or gallops  ABDOMEN: Soft, Nontender, Nondistended; Bowel sounds present  EXTREMITIES:  2+ Peripheral Pulses, No clubbing, cyanosis, or edema  PSYCH: AAOx3  NEUROLOGY: non-focal  SKIN: No rashes or lesions    LABS:                        12.3   9.06  )-----------( 189      ( 2018 06:25 )             37.5     01-21    143  |  102  |  26<H>  ----------------------------<  135<H>  4.0   |  26  |  1.01    Ca    8.7      2018 06:15    TPro  7.1  /  Alb  3.8  /  TBili  0.7  /  DBili  x   /  AST  15  /  ALT  8   /  AlkPhos  60  01-20    PT/INR - ( 2018 06:15 )   PT: 22.1 SEC;   INR: 1.96          PTT - ( 2018 14:18 )  PTT:34.4 SEC  CARDIAC MARKERS ( 2018 14:17 )  x     / < 0.06 ng/mL / 40 u/L / 1.71 ng/mL / x          Urinalysis Basic - ( 2018 15:40 )    Color: COLORL / Appearance: CLEAR / S.009 / pH: 5.5  Gluc: NEGATIVE / Ketone: NEGATIVE  / Bili: NEGATIVE / Urobili: NORMAL mg/dL   Blood: MODERATE / Protein: NEGATIVE mg/dL / Nitrite: NEGATIVE   Leuk Esterase: MODERATE / RBC: 10-25 / WBC 10-25   Sq Epi: OCC / Non Sq Epi: x / Bacteria: x        Culture - Urine (collected 2018 16:14)  Source: URINE CATHETER  Preliminary Report (2018 08:24):    NO GROWTH TO DATE          RADIOLOGY & ADDITIONAL TESTS:    Imaging Personally Reviewed:    Consultant(s) Notes Reviewed:      Care Discussed with Consultants/Other Providers: Patient is a 88y old  Female who presents with a chief complaint of SOB (2018 17:55)      SUBJECTIVE / OVERNIGHT EVENTS: Pt saturated well on Rm air, afebrile overnight.     MEDICATIONS  (STANDING):  ATENolol  Tablet 50 milliGRAM(s) Oral daily  cyanocobalamin 1000 MICROGram(s) Oral daily  fluticasone propionate 50 MICROgram(s)/spray Nasal Spray 2 Spray(s) Both Nostrils daily  furosemide    Tablet 20 milliGRAM(s) Oral daily  influenza   Vaccine 0.5 milliLiter(s) IntraMuscular once  levothyroxine 112 MICROGram(s) Oral daily  pantoprazole    Tablet 40 milliGRAM(s) Oral before breakfast  tiotropium 18 MICROgram(s) Capsule 1 Capsule(s) Inhalation daily    MEDICATIONS  (PRN):  acetaminophen   Tablet 650 milliGRAM(s) Oral every 6 hours PRN For Temp greater than 38 C (100.4 F)  ALBUTerol/ipratropium for Nebulization 3 milliLiter(s) Nebulizer every 6 hours PRN Shortness of Breath and/or Wheezing      Vital Signs Last 24 Hrs  T(C): 36.4 (2018 05:41), Max: 38.7 (2018 14:56)  T(F): 97.6 (2018 05:41), Max: 101.6 (2018 14:56)  HR: 80 (2018 05:41) (80 - 105)  BP: 114/73 (2018 05:41) (91/51 - 137/37)  BP(mean): --  RR: 16 (2018 05:41) (16 - 27)  SpO2: 99% (2018 05:41) (97% - 100%)  CAPILLARY BLOOD GLUCOSE        I&O's Summary      PHYSICAL EXAM:  GENERAL: NAD,   HEAD:  Atraumatic, Normocephalic  EYES: EOMI, PERRLA, conjunctiva and sclera clear  NECK: Supple, No JVD  CHEST/LUNG: Clear to auscultation bilaterally; No wheeze  HEART: Regular rate and rhythm; No murmurs, rubs, or gallops  ABDOMEN: Soft, Nontender, Nondistended; Bowel sounds present. Vental herniation.   EXTREMITIES:  2+ Peripheral Pulses, No clubbing, cyanosis, or edema  PSYCH: AAOx2  NEUROLOGY: non-focal  SKIN: No rashes or lesions    LABS:                        12.3   9.06  )-----------( 189      ( 2018 06:25 )             37.5         143  |  102  |  26<H>  ----------------------------<  135<H>  4.0   |  26  |  1.01    Ca    8.7      2018 06:15    TPro  7.1  /  Alb  3.8  /  TBili  0.7  /  DBili  x   /  AST  15  /  ALT  8   /  AlkPhos  60      PT/INR - ( 2018 06:15 )   PT: 22.1 SEC;   INR: 1.96          PTT - ( 2018 14:18 )  PTT:34.4 SEC  CARDIAC MARKERS ( 2018 14:17 )  x     / < 0.06 ng/mL / 40 u/L / 1.71 ng/mL / x          Urinalysis Basic - ( 2018 15:40 )    Color: COLORL / Appearance: CLEAR / S.009 / pH: 5.5  Gluc: NEGATIVE / Ketone: NEGATIVE  / Bili: NEGATIVE / Urobili: NORMAL mg/dL   Blood: MODERATE / Protein: NEGATIVE mg/dL / Nitrite: NEGATIVE   Leuk Esterase: MODERATE / RBC: 10-25 / WBC 10-25   Sq Epi: OCC / Non Sq Epi: x / Bacteria: x        Culture - Urine (collected 2018 16:14)  Source: URINE CATHETER  Preliminary Report (2018 08:24):    NO GROWTH TO DATE          RADIOLOGY & ADDITIONAL TESTS:    Imaging Personally Reviewed:  < from: Xray Chest 1 View AP- PORTABLE-Urgent (18 @ 15:51) >  FINDINGS:     Cardiac size is enlarged with mild vascular congestion.  Left retrocardiac opacity, likely representing atelectasis and/or   pneumonia.  Trace bilateral pleural effusions. No pneumothorax.  No acute osseous abnormality.      IMPRESSION:   Cardiomegaly with vascular congestion.    < end of copied text >      Consultant(s) Notes Reviewed:      Care Discussed with Consultants/Other Providers:

## 2018-01-21 NOTE — PHYSICAL THERAPY INITIAL EVALUATION ADULT - IMPAIRMENTS FOUND, PT EVAL
aerobic capacity/endurance/decreased midline orientation/circulation/gait, locomotion, and balance/muscle strength

## 2018-01-22 ENCOUNTER — CLINICAL ADVICE (OUTPATIENT)
Age: 83
End: 2018-01-22

## 2018-01-22 ENCOUNTER — OTHER (OUTPATIENT)
Age: 83
End: 2018-01-22

## 2018-01-22 LAB
APTT BLD: 29.5 SEC — SIGNIFICANT CHANGE UP (ref 27.5–37.4)
BUN SERPL-MCNC: 29 MG/DL — HIGH (ref 7–23)
CALCIUM SERPL-MCNC: 8.7 MG/DL — SIGNIFICANT CHANGE UP (ref 8.4–10.5)
CHLORIDE SERPL-SCNC: 107 MMOL/L — SIGNIFICANT CHANGE UP (ref 98–107)
CO2 SERPL-SCNC: 29 MMOL/L — SIGNIFICANT CHANGE UP (ref 22–31)
CREAT SERPL-MCNC: 1.03 MG/DL — SIGNIFICANT CHANGE UP (ref 0.5–1.3)
GLUCOSE SERPL-MCNC: 93 MG/DL — SIGNIFICANT CHANGE UP (ref 70–99)
HCT VFR BLD CALC: 35.7 % — SIGNIFICANT CHANGE UP (ref 34.5–45)
HGB BLD-MCNC: 11.5 G/DL — SIGNIFICANT CHANGE UP (ref 11.5–15.5)
INR BLD: 1.75 — HIGH (ref 0.88–1.17)
MCHC RBC-ENTMCNC: 30.2 PG — SIGNIFICANT CHANGE UP (ref 27–34)
MCHC RBC-ENTMCNC: 32.2 % — SIGNIFICANT CHANGE UP (ref 32–36)
MCV RBC AUTO: 93.7 FL — SIGNIFICANT CHANGE UP (ref 80–100)
NRBC # FLD: 0 — SIGNIFICANT CHANGE UP
PLATELET # BLD AUTO: 198 K/UL — SIGNIFICANT CHANGE UP (ref 150–400)
PMV BLD: 10.2 FL — SIGNIFICANT CHANGE UP (ref 7–13)
POTASSIUM SERPL-MCNC: 3.9 MMOL/L — SIGNIFICANT CHANGE UP (ref 3.5–5.3)
POTASSIUM SERPL-SCNC: 3.9 MMOL/L — SIGNIFICANT CHANGE UP (ref 3.5–5.3)
PROTHROM AB SERPL-ACNC: 20.4 SEC — HIGH (ref 9.8–13.1)
RBC # BLD: 3.81 M/UL — SIGNIFICANT CHANGE UP (ref 3.8–5.2)
RBC # FLD: 14 % — SIGNIFICANT CHANGE UP (ref 10.3–14.5)
SODIUM SERPL-SCNC: 148 MMOL/L — HIGH (ref 135–145)
WBC # BLD: 6.99 K/UL — SIGNIFICANT CHANGE UP (ref 3.8–10.5)
WBC # FLD AUTO: 6.99 K/UL — SIGNIFICANT CHANGE UP (ref 3.8–10.5)

## 2018-01-22 PROCEDURE — 99233 SBSQ HOSP IP/OBS HIGH 50: CPT

## 2018-01-22 RX ORDER — AZITHROMYCIN 500 MG/1
500 TABLET, FILM COATED ORAL EVERY 24 HOURS
Qty: 0 | Refills: 0 | Status: DISCONTINUED | OUTPATIENT
Start: 2018-01-22 | End: 2018-01-23

## 2018-01-22 RX ORDER — CEFTRIAXONE 500 MG/1
1 INJECTION, POWDER, FOR SOLUTION INTRAMUSCULAR; INTRAVENOUS EVERY 24 HOURS
Qty: 0 | Refills: 0 | Status: DISCONTINUED | OUTPATIENT
Start: 2018-01-22 | End: 2018-01-23

## 2018-01-22 RX ORDER — WARFARIN SODIUM 2.5 MG/1
2 TABLET ORAL ONCE
Qty: 0 | Refills: 0 | Status: COMPLETED | OUTPATIENT
Start: 2018-01-22 | End: 2018-01-22

## 2018-01-22 RX ADMIN — PANTOPRAZOLE SODIUM 40 MILLIGRAM(S): 20 TABLET, DELAYED RELEASE ORAL at 05:49

## 2018-01-22 RX ADMIN — PREGABALIN 1000 MICROGRAM(S): 225 CAPSULE ORAL at 11:37

## 2018-01-22 RX ADMIN — TIOTROPIUM BROMIDE 1 CAPSULE(S): 18 CAPSULE ORAL; RESPIRATORY (INHALATION) at 11:37

## 2018-01-22 RX ADMIN — Medication 20 MILLIGRAM(S): at 05:49

## 2018-01-22 RX ADMIN — AZITHROMYCIN 250 MILLIGRAM(S): 500 TABLET, FILM COATED ORAL at 11:37

## 2018-01-22 RX ADMIN — INFLUENZA VIRUS VACCINE 0.5 MILLILITER(S): 15; 15; 15; 15 SUSPENSION INTRAMUSCULAR at 11:37

## 2018-01-22 RX ADMIN — Medication 2 SPRAY(S): at 11:37

## 2018-01-22 RX ADMIN — ATENOLOL 50 MILLIGRAM(S): 25 TABLET ORAL at 05:49

## 2018-01-22 RX ADMIN — Medication 112 MICROGRAM(S): at 05:49

## 2018-01-22 RX ADMIN — WARFARIN SODIUM 2 MILLIGRAM(S): 2.5 TABLET ORAL at 17:00

## 2018-01-22 RX ADMIN — CEFTRIAXONE 100 GRAM(S): 500 INJECTION, POWDER, FOR SOLUTION INTRAMUSCULAR; INTRAVENOUS at 11:37

## 2018-01-22 NOTE — PROGRESS NOTE ADULT - SUBJECTIVE AND OBJECTIVE BOX
Patient is a 88y old  Female who presents with a chief complaint of SOB (2018 17:55)      SUBJECTIVE / OVERNIGHT EVENTS: says she doesn't feel good bec she has lung cancer. on further questioning she denies CP, SOB, cough is improving. no more fevers.     ROS:  No HA/DZ  No Vision changes   No CP, SOB  No N/V/D  No Edema  No Rash  NO weakness, numbness    MEDICATIONS  (STANDING):  ATENolol  Tablet 50 milliGRAM(s) Oral daily  azithromycin  IVPB 500 milliGRAM(s) IV Intermittent every 24 hours  cefTRIAXone   IVPB 1 Gram(s) IV Intermittent every 24 hours  cyanocobalamin 1000 MICROGram(s) Oral daily  fluticasone propionate 50 MICROgram(s)/spray Nasal Spray 2 Spray(s) Both Nostrils daily  furosemide    Tablet 20 milliGRAM(s) Oral daily  levothyroxine 112 MICROGram(s) Oral daily  pantoprazole    Tablet 40 milliGRAM(s) Oral before breakfast  tiotropium 18 MICROgram(s) Capsule 1 Capsule(s) Inhalation daily  warfarin 2 milliGRAM(s) Oral once    MEDICATIONS  (PRN):  acetaminophen   Tablet 650 milliGRAM(s) Oral every 6 hours PRN For Temp greater than 38 C (100.4 F)  ALBUTerol/ipratropium for Nebulization 3 milliLiter(s) Nebulizer every 6 hours PRN Shortness of Breath and/or Wheezing      T(C): 36.4 (18 @ 05:47)  HR: 85 (18 @ 05:47)  BP: 133/88 (18 @ 05:47)  RR: 17 (18 @ 05:47)  SpO2: 95% (18 @ 05:47)  CAPILLARY BLOOD GLUCOSE        I&O's Summary    2018 07:01  -  2018 07:00  --------------------------------------------------------  IN: 0 mL / OUT: 150 mL / NET: -150 mL        PHYSICAL EXAM:  GENERAL: NAD, well-developed, AOx3  HEAD:  Atraumatic, Normocephalic  EYES: EOMI, PERRL, conjunctiva and sclera clear  NECK: Supple, No JVD  CHEST/LUNG: Clear to auscultation bilaterally  HEART: Regular rate and rhythm; No murmurs, rubs, or gallops, No Edema  ABDOMEN: Soft, Nontender, Nondistended; Bowel sounds present  EXTREMITIES:  2+ Peripheral Pulses, No clubbing, cyanosis  PSYCH: No SI/HI  NEUROLOGY: non-focal  SKIN: No rashes or lesions    LABS:                        11.5   6.99  )-----------( 198      ( 2018 06:00 )             35.7     -    148<H>  |  107  |  29<H>  ----------------------------<  93  3.9   |  29  |  1.03    Ca    8.7      2018 06:00    TPro  7.1  /  Alb  3.8  /  TBili  0.7  /  DBili  x   /  AST  15  /  ALT  8   /  AlkPhos  60  01-20    PT/INR - ( 2018 06:00 )   PT: 20.4 SEC;   INR: 1.75          PTT - ( 2018 06:00 )  PTT:29.5 SEC  CARDIAC MARKERS ( 2018 14:17 )  x     / < 0.06 ng/mL / 40 u/L / 1.71 ng/mL / x          Urinalysis Basic - ( 2018 15:40 )    Color: COLORL / Appearance: CLEAR / S.009 / pH: 5.5  Gluc: NEGATIVE / Ketone: NEGATIVE  / Bili: NEGATIVE / Urobili: NORMAL mg/dL   Blood: MODERATE / Protein: NEGATIVE mg/dL / Nitrite: NEGATIVE   Leuk Esterase: MODERATE / RBC: 10-25 / WBC 10-25   Sq Epi: OCC / Non Sq Epi: x / Bacteria: x            RADIOLOGY & ADDITIONAL TESTS:    Imaging Personally Reviewed:    Consultant(s) Notes Reviewed:      Care Discussed with Consultants/Other Providers:

## 2018-01-23 ENCOUNTER — TRANSCRIPTION ENCOUNTER (OUTPATIENT)
Age: 83
End: 2018-01-23

## 2018-01-23 VITALS
HEART RATE: 84 BPM | SYSTOLIC BLOOD PRESSURE: 109 MMHG | OXYGEN SATURATION: 96 % | RESPIRATION RATE: 18 BRPM | WEIGHT: 116.62 LBS | TEMPERATURE: 98 F | DIASTOLIC BLOOD PRESSURE: 79 MMHG

## 2018-01-23 DIAGNOSIS — R69 ILLNESS, UNSPECIFIED: ICD-10-CM

## 2018-01-23 LAB
BUN SERPL-MCNC: 25 MG/DL — HIGH (ref 7–23)
CALCIUM SERPL-MCNC: 8.6 MG/DL — SIGNIFICANT CHANGE UP (ref 8.4–10.5)
CHLORIDE SERPL-SCNC: 101 MMOL/L — SIGNIFICANT CHANGE UP (ref 98–107)
CO2 SERPL-SCNC: 32 MMOL/L — HIGH (ref 22–31)
CREAT SERPL-MCNC: 0.86 MG/DL — SIGNIFICANT CHANGE UP (ref 0.5–1.3)
GLUCOSE SERPL-MCNC: 92 MG/DL — SIGNIFICANT CHANGE UP (ref 70–99)
HCT VFR BLD CALC: 39 % — SIGNIFICANT CHANGE UP (ref 34.5–45)
HGB BLD-MCNC: 12.3 G/DL — SIGNIFICANT CHANGE UP (ref 11.5–15.5)
INR BLD: 1.93 — HIGH (ref 0.88–1.17)
MCHC RBC-ENTMCNC: 30 PG — SIGNIFICANT CHANGE UP (ref 27–34)
MCHC RBC-ENTMCNC: 31.5 % — LOW (ref 32–36)
MCV RBC AUTO: 95.1 FL — SIGNIFICANT CHANGE UP (ref 80–100)
NRBC # FLD: 0 — SIGNIFICANT CHANGE UP
PLATELET # BLD AUTO: 223 K/UL — SIGNIFICANT CHANGE UP (ref 150–400)
PMV BLD: 9.8 FL — SIGNIFICANT CHANGE UP (ref 7–13)
POTASSIUM SERPL-MCNC: 3.7 MMOL/L — SIGNIFICANT CHANGE UP (ref 3.5–5.3)
POTASSIUM SERPL-SCNC: 3.7 MMOL/L — SIGNIFICANT CHANGE UP (ref 3.5–5.3)
PROTHROM AB SERPL-ACNC: 21.7 SEC — HIGH (ref 9.8–13.1)
RBC # BLD: 4.1 M/UL — SIGNIFICANT CHANGE UP (ref 3.8–5.2)
RBC # FLD: 13.9 % — SIGNIFICANT CHANGE UP (ref 10.3–14.5)
SODIUM SERPL-SCNC: 143 MMOL/L — SIGNIFICANT CHANGE UP (ref 135–145)
WBC # BLD: 5.31 K/UL — SIGNIFICANT CHANGE UP (ref 3.8–10.5)
WBC # FLD AUTO: 5.31 K/UL — SIGNIFICANT CHANGE UP (ref 3.8–10.5)

## 2018-01-23 PROCEDURE — 99239 HOSP IP/OBS DSCHRG MGMT >30: CPT

## 2018-01-23 RX ORDER — ACETAMINOPHEN 500 MG
2 TABLET ORAL
Qty: 0 | Refills: 0 | COMMUNITY
Start: 2018-01-23

## 2018-01-23 RX ORDER — WARFARIN SODIUM 2.5 MG/1
2 TABLET ORAL ONCE
Qty: 0 | Refills: 0 | Status: DISCONTINUED | OUTPATIENT
Start: 2018-01-23 | End: 2018-01-23

## 2018-01-23 RX ADMIN — TIOTROPIUM BROMIDE 1 CAPSULE(S): 18 CAPSULE ORAL; RESPIRATORY (INHALATION) at 11:24

## 2018-01-23 RX ADMIN — Medication 2 SPRAY(S): at 11:24

## 2018-01-23 RX ADMIN — PANTOPRAZOLE SODIUM 40 MILLIGRAM(S): 20 TABLET, DELAYED RELEASE ORAL at 06:04

## 2018-01-23 RX ADMIN — ATENOLOL 50 MILLIGRAM(S): 25 TABLET ORAL at 06:04

## 2018-01-23 RX ADMIN — PREGABALIN 1000 MICROGRAM(S): 225 CAPSULE ORAL at 11:24

## 2018-01-23 RX ADMIN — Medication 20 MILLIGRAM(S): at 06:04

## 2018-01-23 RX ADMIN — AZITHROMYCIN 250 MILLIGRAM(S): 500 TABLET, FILM COATED ORAL at 11:18

## 2018-01-23 RX ADMIN — Medication 112 MICROGRAM(S): at 06:04

## 2018-01-23 RX ADMIN — CEFTRIAXONE 100 GRAM(S): 500 INJECTION, POWDER, FOR SOLUTION INTRAMUSCULAR; INTRAVENOUS at 11:21

## 2018-01-23 NOTE — DISCHARGE NOTE ADULT - CARE PLAN
Principal Discharge DX:	Sepsis, due to unspecified organism  Goal:	remain free of infection  Assessment and plan of treatment:	Please complete antibiotics as prescribed.  Please follow up with your pcp within 1 week of discharge.  Please call to make an appointment.  Secondary Diagnosis:	Pneumonia  Goal:	remain free of infection  Assessment and plan of treatment:	Please complete antibiotics as prescribed.  Please follow up with your pcp within 1 week of discharge.  Please call to make an appointment.  Secondary Diagnosis:	Atrial fibrillation  Goal:	continue anticoagulation  Assessment and plan of treatment:	Please follow up with your pcp and cardiologist .  Please call to make an appointment within 1 week of discharge.  Please check PT/INR within 2-3 days for coumadin dosing.  Secondary Diagnosis:	Hypothyroidism  Goal:	continue supplement  Assessment and plan of treatment:	Please follow up with your pcp within 1 week of discharge.  Please call to make an appointment.  Secondary Diagnosis:	Hypertension  Goal:	maintain adequate blood pressure control  Assessment and plan of treatment:	Please check your blood pressure prior to taking your blood pressure medications.  Please follow up with your pcp and cardiologist within 1 week of discharge.  Please call to make an appointment .  Secondary Diagnosis:	Chronic congestive heart failure, unspecified congestive heart failure type  Goal:	remain free of exacerbation  Assessment and plan of treatment:	-Please obtain an echocardiogram as an outpt   -continue with lasix.  Please follow up with your pcp and cardiologist within 1 week of discharge.  Secondary Diagnosis:	Chronic bronchitis, unspecified chronic bronchitis type  Goal:	continue current regimen  Assessment and plan of treatment:	pt non-compliant with Spiriva.  Please follow up up with your pcp within 1 week of discharge.  Please call to make an appointment

## 2018-01-23 NOTE — DISCHARGE NOTE ADULT - PLAN OF CARE
remain free of infection Please complete antibiotics as prescribed.  Please follow up with your pcp within 1 week of discharge.  Please call to make an appointment. continue anticoagulation Please follow up with your pcp and cardiologist .  Please call to make an appointment within 1 week of discharge.  Please check PT/INR within 2-3 days for coumadin dosing. continue supplement Please follow up with your pcp within 1 week of discharge.  Please call to make an appointment. maintain adequate blood pressure control Please check your blood pressure prior to taking your blood pressure medications.  Please follow up with your pcp and cardiologist within 1 week of discharge.  Please call to make an appointment . remain free of exacerbation -Please obtain an echocardiogram as an outpt   -continue with lasix.  Please follow up with your pcp and cardiologist within 1 week of discharge. continue current regimen pt non-compliant with Spiriva.  Please follow up up with your pcp within 1 week of discharge.  Please call to make an appointment

## 2018-01-23 NOTE — PROGRESS NOTE ADULT - PROBLEM SELECTOR PLAN 8
Pt on coumadin  PT rec rehab, patient is refusing at this time, will JULIÁN HCP (Son)  JULIÁN SHARMA and CM
Pt on coumadin  had extensive d/w patient and HCP, son, Teodoro. final is decision if for home, refusing rehab, risk and benefits explained and family/patient understand. will dc home. time spent on dc planning 40 min
Pt on coumadin  PT rehab

## 2018-01-23 NOTE — DISCHARGE NOTE ADULT - ADDITIONAL INSTRUCTIONS
Please obtain an echocardiogram as an outpt  Chronic sinusitis:  c/w Flonase.   Please check PT/INR within 2-3 days for coumadin dosing.

## 2018-01-23 NOTE — PROGRESS NOTE ADULT - PROBLEM SELECTOR PLAN 7
pt non-compliant with Spiriva  c/w nebs prn and Spiriva

## 2018-01-23 NOTE — DISCHARGE NOTE ADULT - PATIENT PORTAL LINK FT
“You can access the [employer] Patient Portal, offered by [facility], by registering with the following website: [email or web address]”

## 2018-01-23 NOTE — DISCHARGE NOTE ADULT - MEDICATION SUMMARY - MEDICATIONS TO TAKE
I will START or STAY ON the medications listed below when I get home from the hospital:    acetaminophen 325 mg oral tablet  -- 2 tab(s) by mouth every 6 hours, As needed, For Temp greater than 38 C (100.4 F)  -- Indication: For fever prn     Coumadin 3 mg oral tablet  -- 0.5 tab(s) by mouth once a day  -- Indication: For afib    atenolol 50 mg oral tablet  -- 1 tab(s) by mouth once a day  -- Indication: For Htn     Spiriva 18 mcg inhalation capsule  -- 1 cap(s) inhaled once a day  -- Indication: For bronchitis    Lasix 20 mg oral tablet  -- 1 tab(s) by mouth once a day  -- Indication: For Congestive heart failure    Flonase 50 mcg/inh nasal spray  -- 2 spray(s) into nose once a day  -- Indication: For Sinusitis    omeprazole 40 mg oral delayed release capsule  -- 1 cap(s) by mouth once a day  -- Indication: For gerd    Levaquin 250 mg oral tablet  -- 1 tab(s) by mouth once a day   -- Avoid prolonged or excessive exposure to direct and/or artificial sunlight while taking this medication.  Do not take dairy products, antacids, or iron preparations within one hour of this medication.  Finish all this medication unless otherwise directed by prescriber.  May cause drowsiness or dizziness.  Medication should be taken with plenty of water.    -- Indication: For [place]    Synthroid 112 mcg (0.112 mg) oral tablet  -- 1 tab(s) by mouth once a day  -- Indication: For Hypothyroidism, unspecified type    Vitamin B12 1000 mcg oral tablet  -- 1 tab(s) by mouth once a day  -- Indication: For Supplement

## 2018-01-23 NOTE — PROGRESS NOTE ADULT - PROBLEM SELECTOR PLAN 2
will c/w coumadin,   INR subtherapeutic likely due to non-compliance  Trend INR
will c/w coumadin,   INR subtherapeutic likely due to non-compliance  Trend INR, dose coumadin, no need for bridge.   Rate controlled on BB
will c/w coumadin,   INR subtherapeutic likely due to non-compliance, now improving   Trend INR, dose coumadin, no need for bridge.   Rate controlled on BB

## 2018-01-23 NOTE — PROGRESS NOTE ADULT - PROBLEM SELECTOR PLAN 1
sepsis (Tachypnea, Fever, tachycardia) likely due to left retrocardiac PNA  Viral panel negative  c/w ceftriaxone and azithromycin  f/u BCx, symptomatic support
sepsis (Tachypnea, Fever, tachycardia) likely due to left retrocardiac PNA  Viral panel negative  c/w ceftriaxone and azithromycin  BCx remain NGTD
sepsis (Tachypnea, Fever, tachycardia) likely due to left retrocardiac PNA  Viral panel negative  sepsis resolving, change to po Levaquin to complete total 7 days   BCx remain NGTD

## 2018-01-23 NOTE — PROGRESS NOTE ADULT - ASSESSMENT
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 Pt found to have Sepsis 2/2 PNA.
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

## 2018-01-23 NOTE — DISCHARGE NOTE ADULT - SECONDARY DIAGNOSIS.
Pneumonia Atrial fibrillation Hypothyroidism Hypertension Chronic congestive heart failure, unspecified congestive heart failure type Chronic bronchitis, unspecified chronic bronchitis type

## 2018-01-23 NOTE — PROGRESS NOTE ADULT - PROBLEM SELECTOR PLAN 5
unclear is systolic or diastolic  Will obtain TTE while inpt ( should not hold up discharge)  c/w PO Lasix, clinically euvolemic
unclear is systolic or diastolic  Will obtain TTE while inpt ( should not hold up discharge)  c/w PO Lasix, clinically euvolemic
unclear is systolic or diastolic  Will obtain TTE while inpt ( should not hold up discharge)  c/w Lasix

## 2018-01-23 NOTE — PROGRESS NOTE ADULT - PROBLEM SELECTOR PLAN 4
controlled   Low salt diet
BP elevated prior to ED arrival, improved in the ED  will c/w atenolol  Trend BP  Low salt diet
controlled   Low salt diet

## 2018-01-23 NOTE — PROGRESS NOTE ADULT - SUBJECTIVE AND OBJECTIVE BOX
Patient is a 88y old  Female who presents with a chief complaint of SOB (23 Jan 2018 12:00)      SUBJECTIVE / OVERNIGHT EVENTS: no events, d/w patient about rehab, she was initially undecided but now wants to go home     ROS:  No HA/DZ  No Vision changes   No CP, SOB  No N/V/D  No Edema  No Rash  NO weakness, numbness    MEDICATIONS  (STANDING):  ATENolol  Tablet 50 milliGRAM(s) Oral daily  azithromycin  IVPB 500 milliGRAM(s) IV Intermittent every 24 hours  cefTRIAXone   IVPB 1 Gram(s) IV Intermittent every 24 hours  cyanocobalamin 1000 MICROGram(s) Oral daily  fluticasone propionate 50 MICROgram(s)/spray Nasal Spray 2 Spray(s) Both Nostrils daily  furosemide    Tablet 20 milliGRAM(s) Oral daily  levothyroxine 112 MICROGram(s) Oral daily  pantoprazole    Tablet 40 milliGRAM(s) Oral before breakfast  tiotropium 18 MICROgram(s) Capsule 1 Capsule(s) Inhalation daily  warfarin 2 milliGRAM(s) Oral once    MEDICATIONS  (PRN):  acetaminophen   Tablet 650 milliGRAM(s) Oral every 6 hours PRN For Temp greater than 38 C (100.4 F)  ALBUTerol/ipratropium for Nebulization 3 milliLiter(s) Nebulizer every 6 hours PRN Shortness of Breath and/or Wheezing      T(C): 36.6 (01-23-18 @ 05:40)  HR: 74 (01-23-18 @ 05:40)  BP: 133/68 (01-23-18 @ 05:40)  RR: 18 (01-23-18 @ 05:40)  SpO2: 94% (01-23-18 @ 05:40)  CAPILLARY BLOOD GLUCOSE        I&O's Summary      PHYSICAL EXAM:  GENERAL: NAD, well-developed, AOx3  HEAD:  Atraumatic, Normocephalic  EYES: EOMI, PERRL, conjunctiva and sclera clear  NECK: Supple, No JVD  CHEST/LUNG: Clear to auscultation bilaterally  HEART: Regular rate and rhythm; No murmurs, rubs, or gallops, No Edema  ABDOMEN: Soft, Nontender, Nondistended; Bowel sounds present  EXTREMITIES:  2+ Peripheral Pulses, No clubbing, cyanosis  PSYCH: No SI/HI  NEUROLOGY: non-focal  SKIN: No rashes or lesions    LABS:                        12.3   5.31  )-----------( 223      ( 23 Jan 2018 06:30 )             39.0     01-23    143  |  101  |  25<H>  ----------------------------<  92  3.7   |  32<H>  |  0.86    Ca    8.6      23 Jan 2018 06:30      PT/INR - ( 23 Jan 2018 06:30 )   PT: 21.7 SEC;   INR: 1.93          PTT - ( 22 Jan 2018 06:00 )  PTT:29.5 SEC              RADIOLOGY & ADDITIONAL TESTS:    Imaging Personally Reviewed:    Consultant(s) Notes Reviewed:      Care Discussed with Consultants/Other Providers:

## 2018-01-23 NOTE — PROGRESS NOTE ADULT - PROBLEM SELECTOR PROBLEM 7
Chronic bronchitis, unspecified chronic bronchitis type

## 2018-01-23 NOTE — PROGRESS NOTE ADULT - PROBLEM SELECTOR PROBLEM 6
Chronic sinusitis, unspecified location

## 2018-01-24 ENCOUNTER — APPOINTMENT (OUTPATIENT)
Dept: HOME HEALTH SERVICES | Facility: HOME HEALTH | Age: 83
End: 2018-01-24

## 2018-01-24 VITALS
SYSTOLIC BLOOD PRESSURE: 110 MMHG | RESPIRATION RATE: 18 BRPM | HEIGHT: 63 IN | BODY MASS INDEX: 20.66 KG/M2 | OXYGEN SATURATION: 96 % | DIASTOLIC BLOOD PRESSURE: 60 MMHG | WEIGHT: 116.6 LBS | TEMPERATURE: 98.2 F | HEART RATE: 89 BPM

## 2018-01-25 LAB
BACTERIA BLD CULT: SIGNIFICANT CHANGE UP
BACTERIA BLD CULT: SIGNIFICANT CHANGE UP

## 2018-02-02 ENCOUNTER — LABORATORY RESULT (OUTPATIENT)
Age: 83
End: 2018-02-02

## 2018-02-02 ENCOUNTER — CLINICAL ADVICE (OUTPATIENT)
Age: 83
End: 2018-02-02

## 2018-02-05 ENCOUNTER — LABORATORY RESULT (OUTPATIENT)
Age: 83
End: 2018-02-05

## 2018-02-07 ENCOUNTER — APPOINTMENT (OUTPATIENT)
Dept: HOME HEALTH SERVICES | Facility: HOME HEALTH | Age: 83
End: 2018-02-07
Payer: MEDICARE

## 2018-02-07 VITALS
OXYGEN SATURATION: 97 % | RESPIRATION RATE: 16 BRPM | HEART RATE: 77 BPM | TEMPERATURE: 97.6 F | SYSTOLIC BLOOD PRESSURE: 120 MMHG | DIASTOLIC BLOOD PRESSURE: 80 MMHG

## 2018-02-07 DIAGNOSIS — Z92.29 PERSONAL HISTORY OF OTHER DRUG THERAPY: ICD-10-CM

## 2018-02-07 PROCEDURE — 99495 TRANSJ CARE MGMT MOD F2F 14D: CPT

## 2018-02-07 RX ORDER — LEVOFLOXACIN 250 MG/1
250 TABLET, FILM COATED ORAL DAILY
Qty: 3 | Refills: 0 | Status: COMPLETED | COMMUNITY
Start: 2018-01-24 | End: 2018-01-26

## 2018-02-12 ENCOUNTER — CLINICAL ADVICE (OUTPATIENT)
Age: 83
End: 2018-02-12

## 2018-02-13 ENCOUNTER — CLINICAL ADVICE (OUTPATIENT)
Age: 83
End: 2018-02-13

## 2018-02-13 ENCOUNTER — MEDICATION RENEWAL (OUTPATIENT)
Age: 83
End: 2018-02-13

## 2018-04-02 ENCOUNTER — LABORATORY RESULT (OUTPATIENT)
Age: 83
End: 2018-04-02

## 2018-04-02 ENCOUNTER — CLINICAL ADVICE (OUTPATIENT)
Age: 83
End: 2018-04-02

## 2018-04-03 ENCOUNTER — APPOINTMENT (OUTPATIENT)
Dept: HOME HEALTH SERVICES | Facility: HOME HEALTH | Age: 83
End: 2018-04-03

## 2018-04-03 VITALS
SYSTOLIC BLOOD PRESSURE: 120 MMHG | RESPIRATION RATE: 16 BRPM | OXYGEN SATURATION: 94 % | DIASTOLIC BLOOD PRESSURE: 76 MMHG | HEART RATE: 94 BPM | TEMPERATURE: 97.2 F

## 2018-04-17 ENCOUNTER — APPOINTMENT (OUTPATIENT)
Dept: HOME HEALTH SERVICES | Facility: HOME HEALTH | Age: 83
End: 2018-04-17
Payer: MEDICARE

## 2018-04-17 VITALS
TEMPERATURE: 98.9 F | RESPIRATION RATE: 16 BRPM | OXYGEN SATURATION: 98 % | HEART RATE: 78 BPM | DIASTOLIC BLOOD PRESSURE: 60 MMHG | SYSTOLIC BLOOD PRESSURE: 96 MMHG

## 2018-04-17 DIAGNOSIS — Z87.19 PERSONAL HISTORY OF OTHER DISEASES OF THE DIGESTIVE SYSTEM: ICD-10-CM

## 2018-04-17 DIAGNOSIS — E53.8 DEFICIENCY OF OTHER SPECIFIED B GROUP VITAMINS: ICD-10-CM

## 2018-04-17 DIAGNOSIS — J32.0 CHRONIC MAXILLARY SINUSITIS: ICD-10-CM

## 2018-04-17 PROCEDURE — 99350 HOME/RES VST EST HIGH MDM 60: CPT

## 2018-04-17 RX ORDER — LIDOCAINE HYDROCHLORIDE 20 MG/ML
2 SOLUTION OROPHARYNGEAL
Qty: 1 | Refills: 0 | Status: COMPLETED | COMMUNITY
Start: 2018-02-02 | End: 2018-04-17

## 2018-04-17 RX ORDER — FLUTICASONE PROPIONATE 50 UG/1
50 SPRAY, METERED NASAL DAILY
Qty: 1 | Refills: 1 | Status: COMPLETED | COMMUNITY
Start: 2017-11-29 | End: 2018-04-17

## 2018-04-17 RX ORDER — IPRATROPIUM BROMIDE AND ALBUTEROL 20; 100 UG/1; UG/1
20-100 SPRAY, METERED RESPIRATORY (INHALATION)
Refills: 0 | Status: COMPLETED | COMMUNITY
End: 2018-04-17

## 2018-04-26 ENCOUNTER — MEDICATION RENEWAL (OUTPATIENT)
Age: 83
End: 2018-04-26

## 2018-04-30 ENCOUNTER — CLINICAL ADVICE (OUTPATIENT)
Age: 83
End: 2018-04-30

## 2018-05-03 LAB
ALBUMIN SERPL ELPH-MCNC: 3.6 G/DL
ANION GAP SERPL CALC-SCNC: 13 MMOL/L
BUN SERPL-MCNC: 21 MG/DL
CALCIUM SERPL-MCNC: 8.7 MG/DL
CHLORIDE SERPL-SCNC: 101 MMOL/L
CO2 SERPL-SCNC: 30 MMOL/L
CREAT SERPL-MCNC: 0.79 MG/DL
GLUCOSE SERPL-MCNC: 107 MG/DL
INR PPP: 2.44 RATIO
PHOSPHATE SERPL-MCNC: 3.2 MG/DL
POTASSIUM SERPL-SCNC: 3.3 MMOL/L
PT BLD: 28.1 SEC
SODIUM SERPL-SCNC: 144 MMOL/L

## 2018-05-08 ENCOUNTER — INPATIENT (INPATIENT)
Facility: HOSPITAL | Age: 83
LOS: 1 days | Discharge: HOME HEALTH | End: 2018-05-10
Attending: HOSPITALIST | Admitting: HOSPITALIST
Payer: MEDICARE

## 2018-05-08 VITALS
TEMPERATURE: 99 F | DIASTOLIC BLOOD PRESSURE: 100 MMHG | SYSTOLIC BLOOD PRESSURE: 144 MMHG | HEART RATE: 110 BPM | RESPIRATION RATE: 18 BRPM | OXYGEN SATURATION: 100 %

## 2018-05-08 DIAGNOSIS — I48.91 UNSPECIFIED ATRIAL FIBRILLATION: ICD-10-CM

## 2018-05-08 DIAGNOSIS — I50.9 HEART FAILURE, UNSPECIFIED: ICD-10-CM

## 2018-05-08 DIAGNOSIS — Z29.9 ENCOUNTER FOR PROPHYLACTIC MEASURES, UNSPECIFIED: ICD-10-CM

## 2018-05-08 DIAGNOSIS — I10 ESSENTIAL (PRIMARY) HYPERTENSION: ICD-10-CM

## 2018-05-08 DIAGNOSIS — L03.90 CELLULITIS, UNSPECIFIED: ICD-10-CM

## 2018-05-08 DIAGNOSIS — A41.9 SEPSIS, UNSPECIFIED ORGANISM: ICD-10-CM

## 2018-05-08 DIAGNOSIS — C34.90 MALIGNANT NEOPLASM OF UNSPECIFIED PART OF UNSPECIFIED BRONCHUS OR LUNG: ICD-10-CM

## 2018-05-08 DIAGNOSIS — E03.9 HYPOTHYROIDISM, UNSPECIFIED: ICD-10-CM

## 2018-05-08 LAB
ALBUMIN SERPL ELPH-MCNC: 3.6 G/DL — SIGNIFICANT CHANGE UP (ref 3.3–5)
ALP SERPL-CCNC: 67 U/L — SIGNIFICANT CHANGE UP (ref 40–120)
ALT FLD-CCNC: 7 U/L — SIGNIFICANT CHANGE UP (ref 4–33)
APPEARANCE UR: SIGNIFICANT CHANGE UP
APTT BLD: 33 SEC — SIGNIFICANT CHANGE UP (ref 27.5–37.4)
AST SERPL-CCNC: 14 U/L — SIGNIFICANT CHANGE UP (ref 4–32)
BACTERIA # UR AUTO: SIGNIFICANT CHANGE UP
BASE EXCESS BLDV CALC-SCNC: 7.7 MMOL/L — SIGNIFICANT CHANGE UP
BASOPHILS # BLD AUTO: 0.03 K/UL — SIGNIFICANT CHANGE UP (ref 0–0.2)
BASOPHILS NFR BLD AUTO: 0.4 % — SIGNIFICANT CHANGE UP (ref 0–2)
BILIRUB SERPL-MCNC: 0.9 MG/DL — SIGNIFICANT CHANGE UP (ref 0.2–1.2)
BILIRUB UR-MCNC: NEGATIVE — SIGNIFICANT CHANGE UP
BLD GP AB SCN SERPL QL: NEGATIVE — SIGNIFICANT CHANGE UP
BLOOD GAS VENOUS - CREATININE: 0.69 MG/DL — SIGNIFICANT CHANGE UP (ref 0.5–1.3)
BLOOD UR QL VISUAL: HIGH
BUN SERPL-MCNC: 16 MG/DL — SIGNIFICANT CHANGE UP (ref 7–23)
CALCIUM SERPL-MCNC: 8.9 MG/DL — SIGNIFICANT CHANGE UP (ref 8.4–10.5)
CHLORIDE BLDV-SCNC: 103 MMOL/L — SIGNIFICANT CHANGE UP (ref 96–108)
CHLORIDE SERPL-SCNC: 98 MMOL/L — SIGNIFICANT CHANGE UP (ref 98–107)
CO2 SERPL-SCNC: 29 MMOL/L — SIGNIFICANT CHANGE UP (ref 22–31)
COLOR SPEC: YELLOW — SIGNIFICANT CHANGE UP
CREAT SERPL-MCNC: 0.69 MG/DL — SIGNIFICANT CHANGE UP (ref 0.5–1.3)
EOSINOPHIL # BLD AUTO: 0.04 K/UL — SIGNIFICANT CHANGE UP (ref 0–0.5)
EOSINOPHIL NFR BLD AUTO: 0.6 % — SIGNIFICANT CHANGE UP (ref 0–6)
GAS PNL BLDV: 139 MMOL/L — SIGNIFICANT CHANGE UP (ref 136–146)
GLUCOSE BLDV-MCNC: 114 — HIGH (ref 70–99)
GLUCOSE SERPL-MCNC: 108 MG/DL — HIGH (ref 70–99)
GLUCOSE UR-MCNC: NEGATIVE — SIGNIFICANT CHANGE UP
HCO3 BLDV-SCNC: 29 MMOL/L — HIGH (ref 20–27)
HCT VFR BLD CALC: 37.5 % — SIGNIFICANT CHANGE UP (ref 34.5–45)
HCT VFR BLDV CALC: 37.1 % — SIGNIFICANT CHANGE UP (ref 34.5–45)
HGB BLD-MCNC: 11.8 G/DL — SIGNIFICANT CHANGE UP (ref 11.5–15.5)
HGB BLDV-MCNC: 12.1 G/DL — SIGNIFICANT CHANGE UP (ref 11.5–15.5)
IMM GRANULOCYTES # BLD AUTO: 0.03 # — SIGNIFICANT CHANGE UP
IMM GRANULOCYTES NFR BLD AUTO: 0.4 % — SIGNIFICANT CHANGE UP (ref 0–1.5)
INR BLD: 2.56 — HIGH (ref 0.88–1.17)
KETONES UR-MCNC: NEGATIVE — SIGNIFICANT CHANGE UP
LACTATE BLDV-MCNC: 1.3 MMOL/L — SIGNIFICANT CHANGE UP (ref 0.5–2)
LEUKOCYTE ESTERASE UR-ACNC: NEGATIVE — SIGNIFICANT CHANGE UP
LYMPHOCYTES # BLD AUTO: 0.54 K/UL — LOW (ref 1–3.3)
LYMPHOCYTES # BLD AUTO: 7.6 % — LOW (ref 13–44)
MCHC RBC-ENTMCNC: 29.5 PG — SIGNIFICANT CHANGE UP (ref 27–34)
MCHC RBC-ENTMCNC: 31.5 % — LOW (ref 32–36)
MCV RBC AUTO: 93.8 FL — SIGNIFICANT CHANGE UP (ref 80–100)
MONOCYTES # BLD AUTO: 0.53 K/UL — SIGNIFICANT CHANGE UP (ref 0–0.9)
MONOCYTES NFR BLD AUTO: 7.4 % — SIGNIFICANT CHANGE UP (ref 2–14)
NEUTROPHILS # BLD AUTO: 5.98 K/UL — SIGNIFICANT CHANGE UP (ref 1.8–7.4)
NEUTROPHILS NFR BLD AUTO: 83.6 % — HIGH (ref 43–77)
NITRITE UR-MCNC: NEGATIVE — SIGNIFICANT CHANGE UP
NRBC # FLD: 0 — SIGNIFICANT CHANGE UP
PCO2 BLDV: 54 MMHG — HIGH (ref 41–51)
PH BLDV: 7.4 PH — SIGNIFICANT CHANGE UP (ref 7.32–7.43)
PH UR: 6 — SIGNIFICANT CHANGE UP (ref 4.6–8)
PLATELET # BLD AUTO: 173 K/UL — SIGNIFICANT CHANGE UP (ref 150–400)
PMV BLD: 10 FL — SIGNIFICANT CHANGE UP (ref 7–13)
PO2 BLDV: < 24 MMHG — LOW (ref 35–40)
POTASSIUM BLDV-SCNC: 3 MMOL/L — LOW (ref 3.4–4.5)
POTASSIUM SERPL-MCNC: 3.2 MMOL/L — LOW (ref 3.5–5.3)
POTASSIUM SERPL-SCNC: 3.2 MMOL/L — LOW (ref 3.5–5.3)
PROT SERPL-MCNC: 6.9 G/DL — SIGNIFICANT CHANGE UP (ref 6–8.3)
PROT UR-MCNC: 100 MG/DL — HIGH
PROTHROM AB SERPL-ACNC: 30 SEC — HIGH (ref 9.8–13.1)
RBC # BLD: 4 M/UL — SIGNIFICANT CHANGE UP (ref 3.8–5.2)
RBC # FLD: 13.6 % — SIGNIFICANT CHANGE UP (ref 10.3–14.5)
RBC CASTS # UR COMP ASSIST: >50 — HIGH (ref 0–?)
RH IG SCN BLD-IMP: POSITIVE — SIGNIFICANT CHANGE UP
SAO2 % BLDV: 27.2 % — LOW (ref 60–85)
SODIUM SERPL-SCNC: 140 MMOL/L — SIGNIFICANT CHANGE UP (ref 135–145)
SP GR SPEC: 1.02 — SIGNIFICANT CHANGE UP (ref 1–1.04)
URATE CRY FLD QL MICRO: SIGNIFICANT CHANGE UP (ref 0–0)
URATE SERPL-MCNC: 6 MG/DL — SIGNIFICANT CHANGE UP (ref 2.5–7)
UROBILINOGEN FLD QL: NORMAL MG/DL — SIGNIFICANT CHANGE UP
WBC # BLD: 7.15 K/UL — SIGNIFICANT CHANGE UP (ref 3.8–10.5)
WBC # FLD AUTO: 7.15 K/UL — SIGNIFICANT CHANGE UP (ref 3.8–10.5)
WBC UR QL: HIGH (ref 0–?)

## 2018-05-08 PROCEDURE — 73110 X-RAY EXAM OF WRIST: CPT | Mod: 26,LT

## 2018-05-08 PROCEDURE — 71045 X-RAY EXAM CHEST 1 VIEW: CPT | Mod: 26

## 2018-05-08 PROCEDURE — 99223 1ST HOSP IP/OBS HIGH 75: CPT | Mod: GC

## 2018-05-08 PROCEDURE — 73130 X-RAY EXAM OF HAND: CPT | Mod: 26,LT

## 2018-05-08 RX ORDER — COLCHICINE 0.6 MG
1.2 TABLET ORAL ONCE
Qty: 0 | Refills: 0 | Status: COMPLETED | OUTPATIENT
Start: 2018-05-08 | End: 2018-05-08

## 2018-05-08 RX ORDER — ATENOLOL 25 MG/1
50 TABLET ORAL DAILY
Qty: 0 | Refills: 0 | Status: DISCONTINUED | OUTPATIENT
Start: 2018-05-08 | End: 2018-05-10

## 2018-05-08 RX ORDER — ACETAMINOPHEN 500 MG
650 TABLET ORAL ONCE
Qty: 0 | Refills: 0 | Status: COMPLETED | OUTPATIENT
Start: 2018-05-08 | End: 2018-05-08

## 2018-05-08 RX ORDER — LEVOTHYROXINE SODIUM 125 MCG
112 TABLET ORAL DAILY
Qty: 0 | Refills: 0 | Status: DISCONTINUED | OUTPATIENT
Start: 2018-05-08 | End: 2018-05-10

## 2018-05-08 RX ORDER — PREGABALIN 225 MG/1
1 CAPSULE ORAL
Qty: 0 | Refills: 0 | COMMUNITY

## 2018-05-08 RX ORDER — WARFARIN SODIUM 2.5 MG/1
1.5 TABLET ORAL ONCE
Qty: 0 | Refills: 0 | Status: COMPLETED | OUTPATIENT
Start: 2018-05-08 | End: 2018-05-08

## 2018-05-08 RX ORDER — COLCHICINE 0.6 MG
0.6 TABLET ORAL ONCE
Qty: 0 | Refills: 0 | Status: COMPLETED | OUTPATIENT
Start: 2018-05-08 | End: 2018-05-08

## 2018-05-08 RX ORDER — VANCOMYCIN HCL 1 G
1000 VIAL (EA) INTRAVENOUS ONCE
Qty: 0 | Refills: 0 | Status: COMPLETED | OUTPATIENT
Start: 2018-05-08 | End: 2018-05-08

## 2018-05-08 RX ORDER — IPRATROPIUM/ALBUTEROL SULFATE 18-103MCG
3 AEROSOL WITH ADAPTER (GRAM) INHALATION ONCE
Qty: 0 | Refills: 0 | Status: COMPLETED | OUTPATIENT
Start: 2018-05-08 | End: 2018-05-08

## 2018-05-08 RX ORDER — TIOTROPIUM BROMIDE 18 UG/1
1 CAPSULE ORAL; RESPIRATORY (INHALATION)
Qty: 0 | Refills: 0 | COMMUNITY

## 2018-05-08 RX ORDER — PIPERACILLIN AND TAZOBACTAM 4; .5 G/20ML; G/20ML
3.38 INJECTION, POWDER, LYOPHILIZED, FOR SOLUTION INTRAVENOUS ONCE
Qty: 0 | Refills: 0 | Status: COMPLETED | OUTPATIENT
Start: 2018-05-08 | End: 2018-05-08

## 2018-05-08 RX ORDER — SODIUM CHLORIDE 9 MG/ML
1000 INJECTION INTRAMUSCULAR; INTRAVENOUS; SUBCUTANEOUS ONCE
Qty: 0 | Refills: 0 | Status: COMPLETED | OUTPATIENT
Start: 2018-05-08 | End: 2018-05-08

## 2018-05-08 RX ORDER — FUROSEMIDE 40 MG
1 TABLET ORAL
Qty: 0 | Refills: 0 | COMMUNITY

## 2018-05-08 RX ORDER — ACETAMINOPHEN 500 MG
650 TABLET ORAL EVERY 6 HOURS
Qty: 0 | Refills: 0 | Status: DISCONTINUED | OUTPATIENT
Start: 2018-05-08 | End: 2018-05-10

## 2018-05-08 RX ORDER — CEFAZOLIN SODIUM 1 G
1000 VIAL (EA) INJECTION EVERY 8 HOURS
Qty: 0 | Refills: 0 | Status: DISCONTINUED | OUTPATIENT
Start: 2018-05-08 | End: 2018-05-10

## 2018-05-08 RX ORDER — PANTOPRAZOLE SODIUM 20 MG/1
40 TABLET, DELAYED RELEASE ORAL
Qty: 0 | Refills: 0 | Status: DISCONTINUED | OUTPATIENT
Start: 2018-05-08 | End: 2018-05-10

## 2018-05-08 RX ORDER — POTASSIUM CHLORIDE 20 MEQ
20 PACKET (EA) ORAL ONCE
Qty: 0 | Refills: 0 | Status: COMPLETED | OUTPATIENT
Start: 2018-05-08 | End: 2018-05-08

## 2018-05-08 RX ORDER — FLUTICASONE PROPIONATE 50 MCG
2 SPRAY, SUSPENSION NASAL
Qty: 0 | Refills: 0 | COMMUNITY

## 2018-05-08 RX ADMIN — Medication 1.2 MILLIGRAM(S): at 11:16

## 2018-05-08 RX ADMIN — Medication 3 MILLILITER(S): at 10:33

## 2018-05-08 RX ADMIN — Medication 100 MILLIGRAM(S): at 22:17

## 2018-05-08 RX ADMIN — Medication 250 MILLIGRAM(S): at 11:16

## 2018-05-08 RX ADMIN — Medication 0.6 MILLIGRAM(S): at 15:09

## 2018-05-08 RX ADMIN — PIPERACILLIN AND TAZOBACTAM 200 GRAM(S): 4; .5 INJECTION, POWDER, LYOPHILIZED, FOR SOLUTION INTRAVENOUS at 10:25

## 2018-05-08 RX ADMIN — SODIUM CHLORIDE 1000 MILLILITER(S): 9 INJECTION INTRAMUSCULAR; INTRAVENOUS; SUBCUTANEOUS at 10:25

## 2018-05-08 RX ADMIN — WARFARIN SODIUM 1.5 MILLIGRAM(S): 2.5 TABLET ORAL at 17:16

## 2018-05-08 RX ADMIN — Medication 20 MILLIEQUIVALENT(S): at 13:00

## 2018-05-08 RX ADMIN — Medication 650 MILLIGRAM(S): at 11:00

## 2018-05-08 RX ADMIN — PANTOPRAZOLE SODIUM 40 MILLIGRAM(S): 20 TABLET, DELAYED RELEASE ORAL at 15:10

## 2018-05-08 RX ADMIN — Medication 112 MICROGRAM(S): at 15:09

## 2018-05-08 RX ADMIN — ATENOLOL 50 MILLIGRAM(S): 25 TABLET ORAL at 15:09

## 2018-05-08 RX ADMIN — Medication 650 MILLIGRAM(S): at 10:25

## 2018-05-08 NOTE — ED PROVIDER NOTE - MEDICAL DECISION MAKING DETAILS
88f w hx COPD, dementia BIBEMS for left second digit pain, swelling and erythema. Also noted to be febrile rectally - meets sepsis criteria given tachycardic and tachypneic as well. Source unclear-cellulitis vs occult pna. Will check labs, ua, cx, fluids, abx, cxr, xray hand and wrist, pain ctrl, adm Wagner: 88f w hx COPD, dementia BIBEMS for left second digit pain, swelling and erythema. Also noted to be febrile rectally - meets sepsis criteria given tachycardic and tachypneic as well. Source unclear-cellulitis vs occult pna. Will check labs, ua, cx, fluids, abx, cxr, xray hand and wrist, pain ctrl, adm

## 2018-05-08 NOTE — H&P ADULT - NSHPSOCIALHISTORY_GEN_ALL_CORE
Walks with cane at baseline No tobacco use, no alcohol use, no illicit drug use    Has a HHA, walks with cane at baseline No tobacco use, no alcohol use, no illicit drug use    Has a HHA 12 hrs/day, 3 days/wk  HCP Teodoro (son), who lives in [place]. Grandson lives in [place], [place]  Walks with [facility] at baseline

## 2018-05-08 NOTE — ED ADULT NURSE NOTE - OBJECTIVE STATEMENT
patient presents to ED via EMS. lives with home health aide during day, is alone at night. hx of a-fib, COPD but does not take home oxygen (refused). pt febrile rectally and tachy.  as per aide pt is at baseline MS with is alert x 3 with confusion as to date. pt presents today with pain to left hand, wrist, redness and swelling to the left pointer finger which as per aide began yesterday but is much worse today. effected finger is red, warm and very tender to touch. pt can not lift L hand without pain. of note pt also has chronic SOB which pt states is not different than normal for her, exp wheezing at rest worsening when pt moves from WC to bed. normoxic on 2 lpm, pulse ox 89% on RA. IV 20 g R forearm labs sent, Fluids and abx infusing, nebs given as per orders. will reassess.

## 2018-05-08 NOTE — H&P ADULT - HISTORY OF PRESENT ILLNESS
Pt is a 87 y/o FM PMH COPD (not on home oxygen), dementia, GERD, hx of Lung CA, AF on coumadin, BIBEMS left finger pain and swelling.       In the ED, T 98.8, , /100, RR 18, 100% RA. Patient received 1L NS bolus, vancomycin 1000 mg x1, zosyn x1, duoneb x1, and colchicine 1.2 x1, and colchicine 0.6 mg x1. Chest xray showing clear lungs. Xray right hand and wrist showing osteoarthrosis but no acute pathology. Pt is a 89 y/o FM PMH COPD (not on home oxygen), dementia, GERD, hx of Lung CA, AF on coumadin, BIBEMS left finger pain and swelling.  Patient is a poor historian.  History obtained through patient and HHA.  Patient has been having worsening left finger and wrist pain/swelling for the last two days. No hx of trauma. Never had similar joint pains in the past. Per HHA, this AM patient had a fever and she decided to bring her to the hospital. She had an episode of diarrhea this morning. She has chronic LE edema.  Patient denies sob, chest pain, abdominal pain, nausea, vomiting, constipation, or LE pain.     Patient lives at home. Has a HHA for 12 hrs/day 3 days/week.  Per HHA, she is at baseline mental status. Patient uses a cane at home for ambulation.     In the ED, T 98.8, , /100, RR 18, 100% RA. Patient received 1L NS bolus, vancomycin 1000 mg x1, zosyn x1, duoneb x1, and colchicine 1.2 x1, and colchicine 0.6 mg x1. Chest xray showing clear lungs. Xray right hand and wrist showing osteoarthrosis but no acute pathology. Pt is a 89 y/o FM PMH COPD (not on home oxygen), dementia, GERD, hx of Lung CA, AF on coumadin, BIBEMS left finger pain and swelling.  Patient is a poor historian.  History obtained through patient and HHA.  Patient has been having worsening left finger and wrist pain/swelling for the last two days. No hx of trauma. Never had similar joint pains in the past. Per HHA, this AM patient had a fever and she decided to bring her to the hospital. She had an episode of diarrhea this morning. She has chronic LE edema.  Patient denies sob, chest pain, abdominal pain, nausea, vomiting, constipation, or LE pain.     Patient lives at home. Has a HHA for 12 hrs/day 3 days/week.  Per HHA, she is at baseline mental status. Patient uses a cane at home for ambulation.     In the ED, T 98.8, , /100, RR 18, 100% RA. T spiked to 103, and HR as high as 123 in the ED. Patient received 1L NS bolus, vancomycin 1000 mg x1, zosyn x1, duoneb x1, and colchicine 1.2 x1, and colchicine 0.6 mg x1. Chest xray showing clear lungs. Xray right hand and wrist showing osteoarthrosis but no acute pathology. Pt is a 89 y/o FM PMH mild dementia, chronic sinusitis, Afib on coumadin, CHF, hypothyroidism, lung CA (2008) s/p CT/RT in remission, GERD BIBEMS left finger pain and swelling.  Patient is a poor historian.  History obtained through patient and HHA.  Patient has been having worsening left finger and wrist pain/swelling for the last two days. No hx of trauma. Never had similar joint pains in the past. Per HHA, this AM patient had a fever and she decided to bring her to the hospital. She had an episode of diarrhea this morning. She has chronic LE edema.  Patient denies sob, chest pain, abdominal pain, nausea, vomiting, constipation, or LE pain.     Patient lives at home. Has a HHA for 12 hrs/day 3 days/week.  Per HHA, she is at baseline mental status. Patient uses a cane at home for ambulation.     In the ED, T 98.8, , /100, RR 18, 100% RA. T spiked to 103, and HR as high as 123 in the ED. Patient received 1L NS bolus, vancomycin 1000 mg x1, zosyn x1, duoneb x1, and colchicine 1.2 x1, and colchicine 0.6 mg x1. Chest xray showing clear lungs. Xray right hand and wrist showing osteoarthrosis but no acute pathology.

## 2018-05-08 NOTE — ED PROVIDER NOTE - PROGRESS NOTE DETAILS
Elizabeth Goldberger PGY-1: xr hand and wrist unremarkable. Admitting pt for sepsis, possibly 2/2 cellulitis. Called PMD [facility] - no response. Will call hospitalist, to whose service pt has been admitted in the past

## 2018-05-08 NOTE — H&P ADULT - PROBLEM SELECTOR PLAN 5
Documented in PMD's note in allscripts that patient has a hx of heart failure.  However, the last TTE in system is in 2012. EF 62%, mild AS, normal LV systolic function, normal RV systolic function  - holding home lasix 40 mg PO (home dose)

## 2018-05-08 NOTE — H&P ADULT - ATTENDING COMMENTS
Patient seen and examined. Agree with above note by resident.    #Cellulitis of left index finger and wrist - no open wounds, no scabs noted, no reported trauma per patient or HHA. Decreased ROM noted on exam. Given erythema, warmth and tenderness, will treat for non purulent cellulitis. Low suspicion for MDR organism at this time. Will treat with cefazolin for skin/soft tissue infection. No fractures noted on xray. No signs of bone involvement. Will check ESR and CRP.   #Sepsis - patient with fever and tachycardia. Likely due to cellulitis. CXR and UA unremarkable. No URI like symptoms. No other signs or symptoms of infection noted.   #Afib - on a/c. Monitor INR. Therapeutic INR. Dose coumadin.   # h/o HFpEF - patient with LE edema and mild basilar crackles. Will hold lasix today in setting of sepsis. However once BP stable, would restart.     Plan discussed with team.

## 2018-05-08 NOTE — H&P ADULT - PROBLEM SELECTOR PLAN 2
Tmax of 103,  with cellulitis of the left finger.  Chest xray with clear lungs.   - f/u UA  - f/u BCx and UCx  - start clindamycin for cellulitis Tmax of 103,  with cellulitis of the left finger.  Chest xray with clear lungs.   - f/u UA, BCx and UCx  - start clindamycin 600 mg q8 for cellulitis Tmax of 103,  with cellulitis of the left finger.  Chest xray with clear lungs.   - f/u UA, BCx and UCx  - start cefazolin

## 2018-05-08 NOTE — ED ADULT NURSE NOTE - CHIEF COMPLAINT QUOTE
pt's HHA states that she found pt this morning with swelling and pain to left index finger, no known injury , finger reddened and warm to touch.  Pt also noted to be lethargic, did not eat breakfast this morning.  PMH HTN, AF, high cholesterol

## 2018-05-08 NOTE — ED ADULT TRIAGE NOTE - CHIEF COMPLAINT QUOTE
pt's HHA states that she found pt this morning with swelling and pain to left index finger and lethargy, did not eat breakfast this morning.  PMH HTN, AF, high cholesterol pt's HHA states that she found pt this morning with swelling and pain to left index finger, no known injury , finger reddened and warm to touch.  Pt also noted to be lethargic, did not eat breakfast this morning.  PMH HTN, AF, high cholesterol

## 2018-05-08 NOTE — H&P ADULT - NSHPLABSRESULTS_GEN_ALL_CORE
11.8   7.15  )-----------( 173      ( 08 May 2018 10:00 )             37.5     05-08    140  |  98  |  16  ----------------------------<  108<H>  3.2<L>   |  29  |  0.69    Ca    8.9      08 May 2018 10:00    TPro  6.9  /  Alb  3.6  /  TBili  0.9  /  DBili  x   /  AST  14  /  ALT  7   /  AlkPhos  67  05-08    < from: Xray Hand 3 Views, Left (05.08.18 @ 11:23) >      IMPRESSION:  No acute bone or joint disease. Osteoarthrosis left hand.      < end of copied text >    < from: Xray Chest 1 View- PORTABLE-Urgent (05.08.18 @ 11:22) >        IMPRESSION:  Cardiomegaly with clear lungs.      < end of copied text > 11.8   7.15  )-----------( 173      ( 08 May 2018 10:00 )             37.5     05-08    140  |  98  |  16  ----------------------------<  108<H>  3.2<L>   |  29  |  0.69    Ca    8.9      08 May 2018 10:00    TPro  6.9  /  Alb  3.6  /  TBili  0.9  /  DBili  x   /  AST  14  /  ALT  7   /  AlkPhos  67  05-08    < from: Xray Hand 3 Views, Left (05.08.18 @ 11:23) >      IMPRESSION:  No acute bone or joint disease. Osteoarthrosis left hand.      < end of copied text >    < from: Xray Wrist 3 Views, Left (05.08.18 @ 11:22) >    INTERPRETATION:     There is no acute fractures or dislocations. Degenerative changes with   narrowing of the PIP and DIP joints. Soft tissue swelling of the index   finger but no subcutaneous air or bone destruction to indicate   osteomyelitis.    The carpal bones are normally aligned. First metacarpal/carpal joint   arthrosis.      COMPARISON:  None available      IMPRESSION:  No acute bone or joint disease. Osteoarthrosis left hand.      < end of copied text >      < from: Xray Chest 1 View- PORTABLE-Urgent (05.08.18 @ 11:22) >        IMPRESSION:  Cardiomegaly with clear lungs.      < end of copied text >

## 2018-05-08 NOTE — ED PROVIDER NOTE - ABDOMINAL EXAM
large ventral hernia in llq and periumbilical, tender but reducible, w/o overlying skin changes/soft/HERNIATION

## 2018-05-08 NOTE — ED ADULT NURSE REASSESSMENT NOTE - NS ED NURSE REASSESS COMMENT FT1
RN Break Coverage : Alert and oriented x 4. Pt received from SERVANDO Avendano in no distress. VSS. Medication administered as ordered. Will continue to monitor.

## 2018-05-08 NOTE — H&P ADULT - PROBLEM SELECTOR PLAN 1
erythema, TTP, and swelling of the left finger extending to wrist. No open wounds.   - start clindamycin  - f/u BCx erythema, TTP, and swelling of the left finger extending to wrist. No open wounds.   - start clindamycin 600 mg q8  - f/u BCx erythema, TTP, and swelling of the left finger extending to wrist. No open wounds.   - start cefazolin

## 2018-05-08 NOTE — ED PROVIDER NOTE - UPPER EXTREMITY EXAM, LEFT
erythema, tenderness, and marked swelling of left second digit PIP, full passive ROM but extreme tenderness, unable to actively range 2/2 pain; also significant left wrist tenderness w/mild swelling, no erythema (pt guarding wrist more so than finger)/JOINT SWELLING/LIMITED ROM/SWELLING/TENDERNESS LIMITED ROM/SWELLING/JOINT SWELLING/TENDERNESS/erythema, tenderness, and marked swelling of left second digit PIP, full passive ROM but extreme tenderness, unable to actively range 2/2 pain; also significant left wrist tenderness w/mild swelling, no erythema (pt guarding wrist more so than finger); no crepitus

## 2018-05-08 NOTE — ED PROVIDER NOTE - OBJECTIVE STATEMENT
88f w hx COPD not on home O2, dementia, GERD, remote hx lung ca in remission, afib, BIBEMS for finger pain and swelling. In contrast to triage note, pt's HHA denies altered mental status- says pt is intermittently confused at baseline and is not more confused right now than normally. Pt herself c/o left hand pain, denies any trauma, and HHA denies any known falls. Pt has been afebrile at home, w/o recent cough, dysuria. Walks w cane at baseline and has been ambulating w/o difficulty. Has been eating usual amount. 88f w hx COPD not on home O2, dementia, GERD, remote hx lung ca in remission, afib on coumadin, BIBEMS for finger pain and swelling. In contrast to triage note, pt's HHA denies altered mental status- says pt is intermittently confused at baseline and is not more confused right now than normally. Pt herself c/o left hand pain, denies any trauma, and HHA denies any known falls. Pt has been afebrile at home, w/o recent cough, dysuria. Walks w cane at baseline and has been ambulating w/o difficulty. Has been eating usual amount. 88f w hx COPD not on home O2, dementia, GERD, remote hx lung ca in remission, afib on coumadin, BIBEMS for finger pain and swelling. In contrast to triage note, pt's HHA denies altered mental status- says pt is intermittently confused at baseline and is not more confused right now than normal. Pt herself c/o left hand pain, denies any trauma, and HHA denies any known falls. Pt has been afebrile at home, w/o recent cough, dysuria. Walks w cane at baseline and has been ambulating w/o difficulty. Has been eating usual amount.

## 2018-05-08 NOTE — H&P ADULT - NSHPPHYSICALEXAM_GEN_ALL_CORE
Vital Signs Last 24 Hrs  T(C): 37.4 (08 May 2018 12:30), Max: 39.1 (08 May 2018 10:20)  T(F): 99.4 (08 May 2018 12:30), Max: 102.4 (08 May 2018 10:20)  HR: 98 (08 May 2018 12:30) (98 - 123)  BP: 104/56 (08 May 2018 12:30) (104/56 - 144/100)  BP(mean): --  RR: 22 (08 May 2018 12:30) (18 - 22)  SpO2: 96% (08 May 2018 12:30) (96% - 100%)    GENERAL: NAD, well-developed  HEAD:  Atraumatic, Normocephalic  EYES: EOMI, PERRLA, conjunctiva and sclera clear  NECK: Supple, No JVD  CHEST/LUNG: Clear to auscultation bilaterally; No wheeze  HEART: Regular rate and rhythm; No murmurs, rubs, or gallops  ABDOMEN: Soft, Nontender, Nondistended; Bowel sounds present  EXTREMITIES:  2+ Peripheral Pulses, No clubbing, cyanosis, or edema  PSYCH: AAOx3  NEUROLOGY: non-focal  SKIN: No rashes or lesions Vital Signs Last 24 Hrs  T(C): 37.4 (08 May 2018 12:30), Max: 39.1 (08 May 2018 10:20)  T(F): 99.4 (08 May 2018 12:30), Max: 102.4 (08 May 2018 10:20)  HR: 98 (08 May 2018 12:30) (98 - 123)  BP: 104/56 (08 May 2018 12:30) (104/56 - 144/100)  BP(mean): --  RR: 22 (08 May 2018 12:30) (18 - 22)  SpO2: 96% (08 May 2018 12:30) (96% - 100%)    GENERAL: NAD  HEAD:  Atraumatic, Normocephalic  EYES: EOMI, conjunctiva and sclera clear  NECK: Supple, No JVD  CHEST/LUNG: +crackles at bases b/l  HEART: Regular rate and rhythm; +systolic murmur  ABDOMEN: Soft, Nontender, Nondistended; Bowel sounds present  LOWER EXTREMITIES:  2+ pitting edema LE, 2+ Peripheral Pulses, No clubbing, cyanosis of LE.   UPPER EXTREMITIES: Left finger with erythema and swelling, dec ROM, and TTP; left wrist mildly swollen, decreased ROM, and TTP.  PSYCH: AAOx2  NEUROLOGY: non-focal  SKIN: erythema of left finger, dry skin on feet, face and arms, no other rashes or lesions

## 2018-05-08 NOTE — H&P ADULT - PROBLEM SELECTOR PLAN 3
- c/w atenolol 50 mg PO daily  - coumadin 3 mg tonight (home dose), INR 2.56 today - c/w atenolol 50 mg PO daily  - coumadin 1.5 mg tonight (home dose is 3mg), INR 2.56 today

## 2018-05-08 NOTE — H&P ADULT - ASSESSMENT
87 yo F w/PMH of mild dementia, chronic sinusitis, Afib on coumadin, CHF, hypothyroidism, lung CA (2008) s/p CT/RT in remission, GERD presenting with sepsis possibly secondary to cellulitis

## 2018-05-08 NOTE — H&P ADULT - NSHPREVIEWOFSYSTEMS_GEN_ALL_CORE
CONSTITUTIONAL:  No weight loss, fever, chills, weakness or fatigue.  HEENT:  Eyes:  No visual loss, blurred vision, double vision or yellow sclerae. Ears, Nose, Throat:  No hearing loss, sneezing, congestion, runny nose or sore throat.  SKIN:  No rash or itching.  CARDIOVASCULAR:  No chest pain, chest pressure or chest discomfort. No palpitations or edema.  RESPIRATORY:  No shortness of breath, cough or sputum.  GASTROINTESTINAL:  No anorexia, nausea, vomiting or diarrhea. No abdominal pain or blood.  GENITOURINARY:  Denies hematuria, dysuria.   NEUROLOGICAL:  No headache, dizziness, syncope, paralysis, ataxia, numbness or tingling in the extremities. No change in bowel or bladder control.  MUSCULOSKELETAL:  No muscle, back pain, joint pain or stiffness.  HEMATOLOGIC:  No anemia, bleeding or bruising.  LYMPHATICS:  No enlarged nodes. No history of splenectomy.  PSYCHIATRIC:  No history of depression or anxiety.  ENDOCRINOLOGIC:  No reports of sweating, cold or heat intolerance. No polyuria or polydipsia.  ALLERGIES:  No history of asthma, hives, eczema or rhinitis. CONSTITUTIONAL:  +fevers, no chills, no weakness  SKIN: erythema of left finger  CARDIOVASCULAR:  No chest pain, chest pressure or chest discomfort. No palpitations or edema.  RESPIRATORY:  No shortness of breath, cough or sputum.  GASTROINTESTINAL:  +diarrhea, No anorexia, nausea, vomiting. No abdominal pain or blood.  NEUROLOGICAL:  No headache  MUSCULOSKELETAL:  +left finger pain and swelling associated with erythema  LYMPHATICS:  No enlarged nodes. No history of splenectomy.  PSYCHIATRIC:  No history of depression or anxiety.  ENDOCRINOLOGIC:  No polyuria or polydipsia.  ALLERGIES:  No history of asthma, hives, eczema or rhinitis.

## 2018-05-09 ENCOUNTER — TRANSCRIPTION ENCOUNTER (OUTPATIENT)
Age: 83
End: 2018-05-09

## 2018-05-09 LAB
ALBUMIN SERPL ELPH-MCNC: 2.8 G/DL — LOW (ref 3.3–5)
ALP SERPL-CCNC: 63 U/L — SIGNIFICANT CHANGE UP (ref 40–120)
ALT FLD-CCNC: 12 U/L — SIGNIFICANT CHANGE UP (ref 4–33)
AST SERPL-CCNC: 24 U/L — SIGNIFICANT CHANGE UP (ref 4–32)
BILIRUB SERPL-MCNC: 0.7 MG/DL — SIGNIFICANT CHANGE UP (ref 0.2–1.2)
BUN SERPL-MCNC: 13 MG/DL — SIGNIFICANT CHANGE UP (ref 7–23)
CALCIUM SERPL-MCNC: 8.6 MG/DL — SIGNIFICANT CHANGE UP (ref 8.4–10.5)
CHLORIDE SERPL-SCNC: 104 MMOL/L — SIGNIFICANT CHANGE UP (ref 98–107)
CO2 SERPL-SCNC: 28 MMOL/L — SIGNIFICANT CHANGE UP (ref 22–31)
CREAT SERPL-MCNC: 0.72 MG/DL — SIGNIFICANT CHANGE UP (ref 0.5–1.3)
CRP SERPL-MCNC: 73.9 MG/L — HIGH
GLUCOSE SERPL-MCNC: 96 MG/DL — SIGNIFICANT CHANGE UP (ref 70–99)
HCT VFR BLD CALC: 37.6 % — SIGNIFICANT CHANGE UP (ref 34.5–45)
HGB BLD-MCNC: 11.8 G/DL — SIGNIFICANT CHANGE UP (ref 11.5–15.5)
INR BLD: 3.05 — HIGH (ref 0.88–1.17)
MAGNESIUM SERPL-MCNC: 1.7 MG/DL — SIGNIFICANT CHANGE UP (ref 1.6–2.6)
MCHC RBC-ENTMCNC: 29.6 PG — SIGNIFICANT CHANGE UP (ref 27–34)
MCHC RBC-ENTMCNC: 31.4 % — LOW (ref 32–36)
MCV RBC AUTO: 94.5 FL — SIGNIFICANT CHANGE UP (ref 80–100)
NRBC # FLD: 0 — SIGNIFICANT CHANGE UP
PHOSPHATE SERPL-MCNC: 2.7 MG/DL — SIGNIFICANT CHANGE UP (ref 2.5–4.5)
PLATELET # BLD AUTO: 170 K/UL — SIGNIFICANT CHANGE UP (ref 150–400)
PMV BLD: 10.4 FL — SIGNIFICANT CHANGE UP (ref 7–13)
POTASSIUM SERPL-MCNC: 3.4 MMOL/L — LOW (ref 3.5–5.3)
POTASSIUM SERPL-SCNC: 3.4 MMOL/L — LOW (ref 3.5–5.3)
PROT SERPL-MCNC: 5.8 G/DL — LOW (ref 6–8.3)
PROTHROM AB SERPL-ACNC: 35.9 SEC — HIGH (ref 9.8–13.1)
RBC # BLD: 3.98 M/UL — SIGNIFICANT CHANGE UP (ref 3.8–5.2)
RBC # FLD: 13.7 % — SIGNIFICANT CHANGE UP (ref 10.3–14.5)
SODIUM SERPL-SCNC: 144 MMOL/L — SIGNIFICANT CHANGE UP (ref 135–145)
SPECIMEN SOURCE: SIGNIFICANT CHANGE UP
SPECIMEN SOURCE: SIGNIFICANT CHANGE UP
WBC # BLD: 5.75 K/UL — SIGNIFICANT CHANGE UP (ref 3.8–10.5)
WBC # FLD AUTO: 5.75 K/UL — SIGNIFICANT CHANGE UP (ref 3.8–10.5)

## 2018-05-09 PROCEDURE — 99233 SBSQ HOSP IP/OBS HIGH 50: CPT | Mod: GC

## 2018-05-09 RX ORDER — POTASSIUM CHLORIDE 20 MEQ
40 PACKET (EA) ORAL EVERY 4 HOURS
Qty: 0 | Refills: 0 | Status: COMPLETED | OUTPATIENT
Start: 2018-05-09 | End: 2018-05-09

## 2018-05-09 RX ORDER — FUROSEMIDE 40 MG
40 TABLET ORAL
Qty: 0 | Refills: 0 | Status: DISCONTINUED | OUTPATIENT
Start: 2018-05-09 | End: 2018-05-10

## 2018-05-09 RX ADMIN — Medication 112 MICROGRAM(S): at 05:10

## 2018-05-09 RX ADMIN — ATENOLOL 50 MILLIGRAM(S): 25 TABLET ORAL at 05:10

## 2018-05-09 RX ADMIN — Medication 100 MILLIGRAM(S): at 21:52

## 2018-05-09 RX ADMIN — PANTOPRAZOLE SODIUM 40 MILLIGRAM(S): 20 TABLET, DELAYED RELEASE ORAL at 06:23

## 2018-05-09 RX ADMIN — Medication 40 MILLIEQUIVALENT(S): at 10:30

## 2018-05-09 RX ADMIN — Medication 40 MILLIGRAM(S): at 17:21

## 2018-05-09 RX ADMIN — Medication 40 MILLIGRAM(S): at 10:29

## 2018-05-09 RX ADMIN — Medication 100 MILLIGRAM(S): at 05:10

## 2018-05-09 RX ADMIN — Medication 100 MILLIGRAM(S): at 13:51

## 2018-05-09 NOTE — PROGRESS NOTE ADULT - SUBJECTIVE AND OBJECTIVE BOX
Patient is a 88y old  Female who presents with a chief complaint of came for Pain , redness and swelling in the left hand, 8-10 pain now (08 May 2018 13:37)      SUBJECTIVE / OVERNIGHT EVENTS:    MEDICATIONS  (STANDING):  ATENolol  Tablet 50 milliGRAM(s) Oral daily  ceFAZolin   IVPB 1000 milliGRAM(s) IV Intermittent every 8 hours  furosemide    Tablet 40 milliGRAM(s) Oral two times a day  levothyroxine 112 MICROGram(s) Oral daily  pantoprazole    Tablet 40 milliGRAM(s) Oral before breakfast  potassium chloride    Tablet ER 40 milliEquivalent(s) Oral every 4 hours    MEDICATIONS  (PRN):  acetaminophen    Suspension. 650 milliGRAM(s) Oral every 6 hours PRN Mild Pain (1 - 3)      CAPILLARY BLOOD GLUCOSE        I&O's Summary    08 May 2018 07:01  -  09 May 2018 07:00  --------------------------------------------------------  IN: 100 mL / OUT: 0 mL / NET: 100 mL    Vital Signs Last 24 Hrs  T(C): 36.1 (09 May 2018 05:02), Max: 37.4 (08 May 2018 12:30)  T(F): 97 (09 May 2018 05:02), Max: 99.4 (08 May 2018 12:30)  HR: 86 (09 May 2018 05:02) (62 - 98)  BP: 119/63 (09 May 2018 05:02) (104/56 - 140/60)  BP(mean): --  RR: 18 (09 May 2018 05:02) (18 - 22)  SpO2: 96% (09 May 2018 05:02) (96% - 98%)    GENERAL: NAD  HEAD:  Atraumatic, Normocephalic  EYES: EOMI, conjunctiva and sclera clear  NECK: Supple, No JVD  CHEST/LUNG: +crackles at bases b/l  HEART: Regular rate and rhythm; +systolic murmur  ABDOMEN: Soft, Nontender, Nondistended; Bowel sounds present  LOWER EXTREMITIES:  2+ pitting edema LE, 2+ Peripheral Pulses, No clubbing, cyanosis of LE.   UPPER EXTREMITIES: Left finger with erythema and swelling, dec ROM, and mild TTP; left wrist mildly swollen, decreased ROM, and no TTP.  PSYCH: AAOx2  NEUROLOGY: non-focal  SKIN: erythema of left finger, dry skin on feet, face and arms, no other rashes, lesions or open wounds    LABS:                        11.8   7.15  )-----------( 173      ( 08 May 2018 10:00 )             37.5     Auto Eosinophil # 0.04  / Auto Eosinophil % 0.6   / Auto Neutrophil # 5.98  / Auto Neutrophil % 83.6  / BANDS % x            144  |  104  |  13  ----------------------------<  96  3.4<L>   |  28  |  0.72      140  |  98  |  16  ----------------------------<  108<H>  3.2<L>   |  29  |  0.69    Ca    8.6      09 May 2018 06:25  Mg     1.7       Phos  2.7       TPro  5.8<L>  /  Alb  2.8<L>  /  TBili  0.7  /  DBili  x   /  AST  24  /  ALT  12  /  AlkPhos  63    TPro  6.9  /  Alb  3.6  /  TBili  0.9  /  DBili  x   /  AST  14  /  ALT  7   /  AlkPhos  67  05-08    PT/INR - ( 09 May 2018 06:25 )   PT: 35.9 SEC;   INR: 3.05          PTT - ( 08 May 2018 10:00 )  PTT:33.0 SEC      Urinalysis Basic - ( 08 May 2018 15:23 )    Color: YELLOW / Appearance: TURBID / S.020 / pH: 6.0  Gluc: NEGATIVE / Ketone: NEGATIVE  / Bili: NEGATIVE / Urobili: NORMAL mg/dL   Blood: LARGE / Protein: 100 mg/dL / Nitrite: NEGATIVE   Leuk Esterase: NEGATIVE / RBC: >50 / WBC 5-10   Sq Epi: x / Non Sq Epi: x / Bacteria: D          RESPIRATORY  VENT:    ABG:     VBG:     RADIOLOGY & ADDITIONAL TESTS:    Imaging Personally Reviewed:    Consultant(s) Notes Reviewed:      Care Discussed with Consultants/Other Providers: Patient is a 88y old  Female who presents with a chief complaint of came for Pain , redness and swelling in the left hand, 8-10 pain now (08 May 2018 13:37)      SUBJECTIVE / OVERNIGHT EVENTS:  No overnight events. Patient feels that the redness in her index finger is improving. Patient also noting SOB with exertion (sitting up right).   No Chest pain or abdominal pain, no diarrhea or constipation.     MEDICATIONS  (STANDING):  ATENolol  Tablet 50 milliGRAM(s) Oral daily  ceFAZolin   IVPB 1000 milliGRAM(s) IV Intermittent every 8 hours  furosemide    Tablet 40 milliGRAM(s) Oral two times a day  levothyroxine 112 MICROGram(s) Oral daily  pantoprazole    Tablet 40 milliGRAM(s) Oral before breakfast  potassium chloride    Tablet ER 40 milliEquivalent(s) Oral every 4 hours    MEDICATIONS  (PRN):  acetaminophen    Suspension. 650 milliGRAM(s) Oral every 6 hours PRN Mild Pain (1 - 3)      CAPILLARY BLOOD GLUCOSE        I&O's Summary    08 May 2018 07:01  -  09 May 2018 07:00  --------------------------------------------------------  IN: 100 mL / OUT: 0 mL / NET: 100 mL    Vital Signs Last 24 Hrs  T(C): 36.1 (09 May 2018 05:02), Max: 37.4 (08 May 2018 12:30)  T(F): 97 (09 May 2018 05:02), Max: 99.4 (08 May 2018 12:30)  HR: 86 (09 May 2018 05:02) (62 - 98)  BP: 119/63 (09 May 2018 05:02) (104/56 - 140/60)  BP(mean): --  RR: 18 (09 May 2018 05:02) (18 - 22)  SpO2: 96% (09 May 2018 05:02) (96% - 98%)    GENERAL: NAD  HEAD:  Atraumatic, Normocephalic  EYES: EOMI, conjunctiva and sclera clear  NECK: Supple, No JVD  CHEST/LUNG: +crackles at bases b/l  HEART: Regular rate and rhythm; +systolic murmur  ABDOMEN: Soft, Nontender, Nondistended; Bowel sounds present  LOWER EXTREMITIES:  2+ pitting edema LE, 2+ Peripheral Pulses, No clubbing, cyanosis of LE.   UPPER EXTREMITIES: Left finger with erythema and swelling, dec ROM, and mild TTP; left wrist mildly swollen, decreased ROM, and no TTP.  PSYCH: AAOx2  NEUROLOGY: non-focal  SKIN: erythema of left finger, dry skin on feet, face and arms, no other rashes, lesions or open wounds    LABS:                        11.8   7.15  )-----------( 173      ( 08 May 2018 10:00 )             37.5     Auto Eosinophil # 0.04  / Auto Eosinophil % 0.6   / Auto Neutrophil # 5.98  / Auto Neutrophil % 83.6  / BANDS % x            144  |  104  |  13  ----------------------------<  96  3.4<L>   |  28  |  0.72      140  |  98  |  16  ----------------------------<  108<H>  3.2<L>   |  29  |  0.69    Ca    8.6      09 May 2018 06:25  Mg     1.7     -  Phos  2.7     -  TPro  5.8<L>  /  Alb  2.8<L>  /  TBili  0.7  /  DBili  x   /  AST  24  /  ALT  12  /  AlkPhos  63  05-09  TPro  6.9  /  Alb  3.6  /  TBili  0.9  /  DBili  x   /  AST  14  /  ALT  7   /  AlkPhos  67  05-08    PT/INR - ( 09 May 2018 06:25 )   PT: 35.9 SEC;   INR: 3.05          PTT - ( 08 May 2018 10:00 )  PTT:33.0 SEC      Urinalysis Basic - ( 08 May 2018 15:23 )    Color: YELLOW / Appearance: TURBID / S.020 / pH: 6.0  Gluc: NEGATIVE / Ketone: NEGATIVE  / Bili: NEGATIVE / Urobili: NORMAL mg/dL   Blood: LARGE / Protein: 100 mg/dL / Nitrite: NEGATIVE   Leuk Esterase: NEGATIVE / RBC: >50 / WBC 5-10   Sq Epi: x / Non Sq Epi: x / Bacteria: D          RESPIRATORY  VENT:    ABG:     VBG:     RADIOLOGY & ADDITIONAL TESTS:    Imaging Personally Reviewed:    Consultant(s) Notes Reviewed:      Care Discussed with Consultants/Other Providers:

## 2018-05-09 NOTE — DISCHARGE NOTE ADULT - NS AS DC HF EDUCATION INSTRUCTIONS
Monitor Weight Daily/Activities as tolerated/Call Primary Care Provider for follow-up after discharge/Low salt diet/Report weight gain of 2 or more pounds in one day or 3 or more pounds in one week, worsening shortness of breath, fatigue, weakness, increased swelling of hands and feet to primary care provider

## 2018-05-09 NOTE — DISCHARGE NOTE ADULT - CARE PLAN
Principal Discharge DX:	Cellulitis  Goal:	Resolution  Secondary Diagnosis:	Atrial Fibrillation  Secondary Diagnosis:	Congestive heart failure  Secondary Diagnosis:	Hypertension Principal Discharge DX:	Cellulitis  Goal:	Resolution  Assessment and plan of treatment:	Please continue antibiotics as prescribed. You will need to see your primary care physician within 1-2 weeks of discharge.  Secondary Diagnosis:	Atrial Fibrillation  Assessment and plan of treatment:	Please continue coumadin as prescribed.  You will need to see your primary care physician within 1-2 weeks of discharge.  Secondary Diagnosis:	Congestive heart failure  Goal:	Management  Assessment and plan of treatment:	Please continue lasix as prescribed. You will need to see your primary care physician within 1-2 weeks of discharge  Secondary Diagnosis:	Hypertension  Assessment and plan of treatment:	Please continue Blood pressure medications as prescribed. You will need to see your primary care physician within 1-2 weeks of discharge Principal Discharge DX:	Cellulitis  Goal:	Resolution  Assessment and plan of treatment:	Please continue antibiotics as prescribed until pills run out. You will need to see your primary care physician within 1 week of discharge.  Secondary Diagnosis:	Atrial Fibrillation  Assessment and plan of treatment:	Your INR levels (coumadin levels) have been fluctuating during your admission. Continue with coumadin as prescribed but you will need to see your primary care physician within 1 week of discharge to have your blood work checked (INR level).  Secondary Diagnosis:	Congestive heart failure  Goal:	Management  Assessment and plan of treatment:	During this admission, your lasix dose was increased to 40 mg twice a day.  Because of this you can have electrolyte abnormalities.  You will need to see your primary care physician within 1 week of discharge to have a basic metabolic panel (bloodwork) to monitor your electrolyte levels.  Secondary Diagnosis:	Hypertension  Assessment and plan of treatment:	Please continue Blood pressure medications as prescribed. You will need to see your primary care physician within 1 week of discharge.

## 2018-05-09 NOTE — PHYSICAL THERAPY INITIAL EVALUATION ADULT - LIVES WITH, PROFILE
Grandson and HHA in a house with 6-7 steps to enter with HR and steps in house that patient does not use.

## 2018-05-09 NOTE — DISCHARGE NOTE ADULT - NS AS DC FOLLOWUP STROKE INST
Coumadin/Warfarin Coumadin/Warfarin/Smoking Cessation Heart Failure/Smoking Cessation/Coumadin/Warfarin

## 2018-05-09 NOTE — DISCHARGE NOTE ADULT - PATIENT PORTAL LINK FT
You can access the [facility] Patient Portal, offered by [facility], by registering with the following website: [email or web address]

## 2018-05-09 NOTE — DISCHARGE NOTE ADULT - CONDITIONS AT DISCHARGE
Patient is stable and improved , alert and oriented x 3 confused and forgetful at times.  She does walk about freely, gait is steady; edema in both Legs are much improved and all Vital signs are stable .  Discharge Instructions are discussed with her Care giver Alejandro  and Copies provided.  Her Skin remains intact as upon admission.

## 2018-05-09 NOTE — DISCHARGE NOTE ADULT - PLAN OF CARE
Resolution Please continue antibiotics as prescribed. You will need to see your primary care physician within 1-2 weeks of discharge. Please continue coumadin as prescribed.  You will need to see your primary care physician within 1-2 weeks of discharge. Management Please continue lasix as prescribed. You will need to see your primary care physician within 1-2 weeks of discharge Please continue Blood pressure medications as prescribed. You will need to see your primary care physician within 1-2 weeks of discharge Please continue antibiotics as prescribed until pills run out. You will need to see your primary care physician within 1 week of discharge. Your INR levels (coumadin levels) have been fluctuating during your admission. Continue with coumadin as prescribed but you will need to see your primary care physician within 1 week of discharge to have your blood work checked (INR level). During this admission, your lasix dose was increased to 40 mg twice a day.  Because of this you can have electrolyte abnormalities.  You will need to see your primary care physician within 1 week of discharge to have a basic metabolic panel (bloodwork) to monitor your electrolyte levels. Please continue Blood pressure medications as prescribed. You will need to see your primary care physician within 1 week of discharge.

## 2018-05-09 NOTE — DISCHARGE NOTE ADULT - MEDICATION SUMMARY - MEDICATIONS TO TAKE
I will START or STAY ON the medications listed below when I get home from the hospital:    Coumadin 3 mg oral tablet  -- 0.5 tab(s) by mouth once a day  -- Indication: For Atrial Fibrillation    atenolol 50 mg oral tablet  -- 1 tab(s) by mouth once a day  -- Indication: For Hypertension    cephalexin 500 mg oral capsule  -- 1 cap(s) by mouth 4 times a day   -- Finish all this medication unless otherwise directed by prescriber.    -- Indication: For Cellulitis    furosemide 40 mg oral tablet  -- 1 tab(s) by mouth 2 times a day  -- Indication: For Congestive heart failure    potassium chloride 20 mEq oral tablet, extended release  -- 1 tab(s) by mouth once a day  -- Indication: For electrolyte abnormalities    omeprazole 40 mg oral delayed release capsule  -- 1 cap(s) by mouth once a day  -- Indication: For GERD    Synthroid 112 mcg (0.112 mg) oral tablet  -- 1 tab(s) by mouth once a day  -- Indication: For Hypothyroidism I will START or STAY ON the medications listed below when I get home from the hospital:    Coumadin 3 mg oral tablet  -- 0.5 tab(s) by mouth once a day  -- Indication: For Atrial Fibrillation    atenolol 50 mg oral tablet  -- 1 tab(s) by mouth once a day  -- Indication: For Hypertension    cephalexin 500 mg oral capsule  -- 1 cap(s) by mouth 4 times a day   -- Finish all this medication unless otherwise directed by prescriber.    -- Indication: For Cellulitis    furosemide 40 mg oral tablet  -- 1 tab(s) by mouth 2 times a day  -- Indication: For Congestive heart failure    potassium chloride 20 mEq oral tablet, extended release  -- 2 tab(s) by mouth once a day   -- It is very important that you take or use this exactly as directed.  Do not skip doses or discontinue unless directed by your doctor.  Medication should be taken with plenty of water.  Take with food or milk.    -- Indication: For electrolyte abnormality    omeprazole 40 mg oral delayed release capsule  -- 1 cap(s) by mouth once a day  -- Indication: For GERD    Synthroid 112 mcg (0.112 mg) oral tablet  -- 1 tab(s) by mouth once a day  -- Indication: For Hypothyroidism

## 2018-05-09 NOTE — PROGRESS NOTE ADULT - PROBLEM SELECTOR PLAN 3
- c/w atenolol 50 mg PO daily  - coumadin 1.5 mg tonight (home dose is 3mg), INR 2.56 today - c/w atenolol 50 mg PO daily  - INR supratherapeutic, will hold today's coumadin.

## 2018-05-09 NOTE — DISCHARGE NOTE ADULT - CARE PROVIDERS DIRECT ADDRESSES
[email or web address] ,DirectAddress_Unknown

## 2018-05-09 NOTE — DISCHARGE NOTE ADULT - HOSPITAL COURSE
Pt is a 87 y/o FM PMH mild dementia, chronic sinusitis, Afib on coumadin, CHF, hypothyroidism, lung CA (2008) s/p CT/RT in remission, GERD BIBEMS left finger pain and swelling for two days. No hx of trauma. Never had similar joint pains in the past. HHA brought patient in to the hospital because she was having fevers at home.      In the ED, T 98.8, , /100, RR 18, 100% RA. T spiked to 103, and HR as high as 123 in the ED. Patient received 1L NS bolus, vancomycin 1000 mg x1, zosyn x1, duoneb x1, and colchicine 1.2 x1, and colchicine 0.6 mg x1. Chest xray showing clear lungs. Xray right hand and wrist showing osteoarthrosis but no acute pathology. Patient admitted to the general medicine floor for cellulitis.     Patient stated on cefazolin for treatment of cellulitis. UA was negative for infection. BCx and UCx showed _________. PT consult recommended home without PT. Pt is a 87 y/o FM PMH mild dementia, chronic sinusitis, Afib on coumadin, CHF, hypothyroidism, lung CA (2008) s/p CT/RT in remission, GERD BIBEMS left finger pain and swelling for two days. No hx of trauma. Never had similar joint pains in the past. HHA brought patient in to the hospital because she was having fevers at home.      In the ED, T 98.8, , /100, RR 18, 100% RA. T spiked to 103, and HR as high as 123 in the ED. Patient received 1L NS bolus, vancomycin 1000 mg x1, zosyn x1, duoneb x1, and colchicine 1.2 x1, and colchicine 0.6 mg x1. Chest xray showing clear lungs. Xray right hand and wrist showing osteoarthrosis but no acute pathology. Patient admitted to the general medicine floor for cellulitis.     Patient stated on cefazolin for treatment of cellulitis. UA was negative for infection. BCx and UCx were negative. Patient was volume overloaded on exam with crackles and 2+ pitting edema. Patient started on furosemide 40mg BID. PT consult recommended home without PT. Patient advised to follow up with PCP for a basic metabolic panel and a INR check for appropriate coumadin dosing.  Patient discharged on 3 days of keflex for cellulitis.     Patient stable for discharge.

## 2018-05-09 NOTE — DISCHARGE NOTE ADULT - HOME CARE AGENCY
Has [facility],  [place] [phone]. Has 12H/7D [facility] thru [facility]  [phone]. In the process of getting 24H/7D HHA thru [facility] to begin 05/13/18/

## 2018-05-09 NOTE — PROGRESS NOTE ADULT - PROBLEM SELECTOR PLAN 2
Tmax of 103,  with cellulitis of the left finger.  Chest xray with clear lungs.   - UA negative for infxn  - f/u BCx and UCx  - c/w cefazolin

## 2018-05-09 NOTE — PROGRESS NOTE ADULT - PROBLEM SELECTOR PLAN 5
Documented in PMD's note in allscripts that patient has a hx of heart failure.  However, the last TTE in system is in 2012. EF 62%, mild AS, normal LV systolic function, normal RV systolic function  - holding home lasix 40 mg PO (home dose) Documented in PMD's note in allscripts that patient has a hx of heart failure.  However, the last TTE in system is in 2012. EF 62%, mild AS, normal LV systolic function, normal RV systolic function  - Pt with crackles on exam and 2+ pitting edema  - restart furosemide 40 PO BID  - daily weights

## 2018-05-09 NOTE — DISCHARGE NOTE ADULT - ADDITIONAL INSTRUCTIONS
1) Please follow up with your primary care doctor within 1-2 weeks of discharge from the hospital. Please call the office to make an appointment. 1) Please follow up with your primary care doctor within 1 week of discharge from the hospital. You will need to have blood work to check your electrolytes (basic metabolic panel) and INR for coumadin dosing.

## 2018-05-09 NOTE — PROGRESS NOTE ADULT - PROBLEM SELECTOR PLAN 1
erythema, TTP, and swelling of the left finger extending to wrist. No open wounds.   - c/w cefazolin  - f/u BCx

## 2018-05-09 NOTE — PROGRESS NOTE ADULT - ASSESSMENT
87 yo F w/PMH of mild dementia, chronic sinusitis, Afib on coumadin, CHF, hypothyroidism, lung CA (2008) s/p CT/RT in remission, GERD presenting with sepsis possibly secondary to cellulitis. 87 yo F w/PMH of mild dementia, chronic sinusitis, Afib on coumadin, CHF, hypothyroidism, lung CA (2008) s/p CT/RT in remission, GERD presenting with sepsis 2/2 to cellulitis.

## 2018-05-09 NOTE — DISCHARGE NOTE ADULT - MEDICATION SUMMARY - MEDICATIONS TO CHANGE
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 [id number]. Detail Level: Zone I will SWITCH the dose or number of times a day I take the medications listed below when I get home from the hospital:    furosemide 40 mg oral tablet  -- 1 tab(s) by mouth once a day I will SWITCH the dose or number of times a day I take the medications listed below when I get home from the hospital:    furosemide 40 mg oral tablet  -- 1 tab(s) by mouth once a day    potassium chloride 20 mEq oral tablet, extended release  -- 1 tab(s) by mouth once a day

## 2018-05-09 NOTE — DISCHARGE NOTE ADULT - CARE PROVIDER_API CALL
Harshil Lebron), Family Medicine  [address]  [address]  [place] [address]  Phone: [phone]  Fax: [phone] Harshil Lebron  Phone: [phone]  Fax: (       -

## 2018-05-10 VITALS
TEMPERATURE: 98 F | RESPIRATION RATE: 18 BRPM | SYSTOLIC BLOOD PRESSURE: 132 MMHG | OXYGEN SATURATION: 96 % | HEART RATE: 78 BPM | DIASTOLIC BLOOD PRESSURE: 77 MMHG

## 2018-05-10 LAB
BACTERIA UR CULT: SIGNIFICANT CHANGE UP
BASOPHILS # BLD AUTO: 0.05 K/UL — SIGNIFICANT CHANGE UP (ref 0–0.2)
BASOPHILS NFR BLD AUTO: 0.9 % — SIGNIFICANT CHANGE UP (ref 0–2)
BUN SERPL-MCNC: 14 MG/DL — SIGNIFICANT CHANGE UP (ref 7–23)
CALCIUM SERPL-MCNC: 8.9 MG/DL — SIGNIFICANT CHANGE UP (ref 8.4–10.5)
CHLORIDE SERPL-SCNC: 102 MMOL/L — SIGNIFICANT CHANGE UP (ref 98–107)
CO2 SERPL-SCNC: 30 MMOL/L — SIGNIFICANT CHANGE UP (ref 22–31)
CREAT SERPL-MCNC: 0.69 MG/DL — SIGNIFICANT CHANGE UP (ref 0.5–1.3)
EOSINOPHIL # BLD AUTO: 0.1 K/UL — SIGNIFICANT CHANGE UP (ref 0–0.5)
EOSINOPHIL NFR BLD AUTO: 1.9 % — SIGNIFICANT CHANGE UP (ref 0–6)
ERYTHROCYTE [SEDIMENTATION RATE] IN BLOOD: 37 MM/HR — HIGH (ref 4–25)
GLUCOSE SERPL-MCNC: 108 MG/DL — HIGH (ref 70–99)
HCT VFR BLD CALC: 36 % — SIGNIFICANT CHANGE UP (ref 34.5–45)
HGB BLD-MCNC: 11.4 G/DL — LOW (ref 11.5–15.5)
IMM GRANULOCYTES # BLD AUTO: 0.01 # — SIGNIFICANT CHANGE UP
IMM GRANULOCYTES NFR BLD AUTO: 0.2 % — SIGNIFICANT CHANGE UP (ref 0–1.5)
INR BLD: 2.67 — HIGH (ref 0.88–1.17)
LYMPHOCYTES # BLD AUTO: 0.87 K/UL — LOW (ref 1–3.3)
LYMPHOCYTES # BLD AUTO: 16.3 % — SIGNIFICANT CHANGE UP (ref 13–44)
MAGNESIUM SERPL-MCNC: 1.5 MG/DL — LOW (ref 1.6–2.6)
MCHC RBC-ENTMCNC: 29.4 PG — SIGNIFICANT CHANGE UP (ref 27–34)
MCHC RBC-ENTMCNC: 31.7 % — LOW (ref 32–36)
MCV RBC AUTO: 92.8 FL — SIGNIFICANT CHANGE UP (ref 80–100)
MONOCYTES # BLD AUTO: 0.41 K/UL — SIGNIFICANT CHANGE UP (ref 0–0.9)
MONOCYTES NFR BLD AUTO: 7.7 % — SIGNIFICANT CHANGE UP (ref 2–14)
NEUTROPHILS # BLD AUTO: 3.9 K/UL — SIGNIFICANT CHANGE UP (ref 1.8–7.4)
NEUTROPHILS NFR BLD AUTO: 73 % — SIGNIFICANT CHANGE UP (ref 43–77)
NRBC # FLD: 0 — SIGNIFICANT CHANGE UP
PHOSPHATE SERPL-MCNC: 2.7 MG/DL — SIGNIFICANT CHANGE UP (ref 2.5–4.5)
PLATELET # BLD AUTO: 176 K/UL — SIGNIFICANT CHANGE UP (ref 150–400)
PMV BLD: 10.4 FL — SIGNIFICANT CHANGE UP (ref 7–13)
POTASSIUM SERPL-MCNC: 3.1 MMOL/L — LOW (ref 3.5–5.3)
POTASSIUM SERPL-SCNC: 3.1 MMOL/L — LOW (ref 3.5–5.3)
PROTHROM AB SERPL-ACNC: 30.2 SEC — HIGH (ref 9.8–13.1)
RBC # BLD: 3.88 M/UL — SIGNIFICANT CHANGE UP (ref 3.8–5.2)
RBC # FLD: 13.6 % — SIGNIFICANT CHANGE UP (ref 10.3–14.5)
SODIUM SERPL-SCNC: 144 MMOL/L — SIGNIFICANT CHANGE UP (ref 135–145)
SPECIMEN SOURCE: SIGNIFICANT CHANGE UP
WBC # BLD: 5.34 K/UL — SIGNIFICANT CHANGE UP (ref 3.8–10.5)
WBC # FLD AUTO: 5.34 K/UL — SIGNIFICANT CHANGE UP (ref 3.8–10.5)

## 2018-05-10 PROCEDURE — 99239 HOSP IP/OBS DSCHRG MGMT >30: CPT

## 2018-05-10 RX ORDER — ATENOLOL 25 MG/1
1 TABLET ORAL
Qty: 0 | Refills: 0 | COMMUNITY
Start: 2018-05-10

## 2018-05-10 RX ORDER — WARFARIN SODIUM 2.5 MG/1
1.5 TABLET ORAL ONCE
Qty: 0 | Refills: 0 | Status: DISCONTINUED | OUTPATIENT
Start: 2018-05-10 | End: 2018-05-10

## 2018-05-10 RX ORDER — CEPHALEXIN 500 MG
1 CAPSULE ORAL
Qty: 12 | Refills: 0 | OUTPATIENT
Start: 2018-05-10 | End: 2018-05-12

## 2018-05-10 RX ORDER — POTASSIUM CHLORIDE 20 MEQ
2 PACKET (EA) ORAL
Qty: 28 | Refills: 0 | OUTPATIENT
Start: 2018-05-10 | End: 2018-05-23

## 2018-05-10 RX ORDER — CEPHALEXIN 500 MG
1 CAPSULE ORAL
Qty: 8 | Refills: 0 | OUTPATIENT
Start: 2018-05-10 | End: 2018-05-11

## 2018-05-10 RX ORDER — FUROSEMIDE 40 MG
1 TABLET ORAL
Qty: 0 | Refills: 0 | COMMUNITY

## 2018-05-10 RX ORDER — FUROSEMIDE 40 MG
1 TABLET ORAL
Qty: 20 | Refills: 0 | OUTPATIENT
Start: 2018-05-10 | End: 2018-05-19

## 2018-05-10 RX ORDER — MAGNESIUM SULFATE 500 MG/ML
2 VIAL (ML) INJECTION ONCE
Qty: 0 | Refills: 0 | Status: COMPLETED | OUTPATIENT
Start: 2018-05-10 | End: 2018-05-10

## 2018-05-10 RX ORDER — POTASSIUM CHLORIDE 20 MEQ
1 PACKET (EA) ORAL
Qty: 0 | Refills: 0 | COMMUNITY

## 2018-05-10 RX ORDER — POTASSIUM CHLORIDE 20 MEQ
40 PACKET (EA) ORAL ONCE
Qty: 0 | Refills: 0 | Status: DISCONTINUED | OUTPATIENT
Start: 2018-05-10 | End: 2018-05-10

## 2018-05-10 RX ORDER — POTASSIUM CHLORIDE 20 MEQ
40 PACKET (EA) ORAL EVERY 4 HOURS
Qty: 0 | Refills: 0 | Status: DISCONTINUED | OUTPATIENT
Start: 2018-05-10 | End: 2018-05-10

## 2018-05-10 RX ORDER — ATENOLOL 25 MG/1
1 TABLET ORAL
Qty: 0 | Refills: 0 | COMMUNITY

## 2018-05-10 RX ADMIN — PANTOPRAZOLE SODIUM 40 MILLIGRAM(S): 20 TABLET, DELAYED RELEASE ORAL at 06:21

## 2018-05-10 RX ADMIN — Medication 100 MILLIGRAM(S): at 06:21

## 2018-05-10 RX ADMIN — Medication 112 MICROGRAM(S): at 06:21

## 2018-05-10 RX ADMIN — Medication 50 GRAM(S): at 10:37

## 2018-05-10 RX ADMIN — Medication 100 MILLIGRAM(S): at 13:05

## 2018-05-10 RX ADMIN — Medication 40 MILLIEQUIVALENT(S): at 13:07

## 2018-05-10 RX ADMIN — ATENOLOL 50 MILLIGRAM(S): 25 TABLET ORAL at 06:21

## 2018-05-10 RX ADMIN — Medication 40 MILLIGRAM(S): at 06:21

## 2018-05-10 NOTE — PROGRESS NOTE ADULT - PROBLEM SELECTOR PLAN 2
Tmax of 103,  with cellulitis of the left finger.  Chest xray with clear lungs.   - UA negative for infxn  - f/u BCx and UCx  - c/w cefazolin Tmax of 103,  with cellulitis of the left finger.  Chest xray with clear lungs.   - UA negative for infxn  - BCx and UCx NGTD  - c/w cefazolin, will d/c on keflex

## 2018-05-10 NOTE — PROGRESS NOTE ADULT - PROBLEM SELECTOR PLAN 1
erythema, TTP, and swelling of the left finger extending to wrist. No open wounds.   - c/w cefazolin  - f/u BCx erythema, TTP, and swelling of the left finger extending to wrist. No open wounds.   - c/w cefazolin for today, will d/c on keflex  - BCx NGTD

## 2018-05-10 NOTE — PROGRESS NOTE ADULT - ASSESSMENT
89 yo F w/PMH of mild dementia, chronic sinusitis, Afib on coumadin, CHF, hypothyroidism, lung CA (2008) s/p CT/RT in remission, GERD presenting with sepsis 2/2 to cellulitis.

## 2018-05-10 NOTE — PROGRESS NOTE ADULT - SUBJECTIVE AND OBJECTIVE BOX
Patient is a 88y old  Female who presents with a chief complaint of came for Pain , redness and swelling in the left hand, 8-10 pain now (08 May 2018 13:37)      SUBJECTIVE / OVERNIGHT EVENTS:  No overnight events.       MEDICATIONS  (STANDING):  ATENolol  Tablet 50 milliGRAM(s) Oral daily  ceFAZolin   IVPB 1000 milliGRAM(s) IV Intermittent every 8 hours  furosemide    Tablet 40 milliGRAM(s) Oral two times a day  levothyroxine 112 MICROGram(s) Oral daily  pantoprazole    Tablet 40 milliGRAM(s) Oral before breakfast    MEDICATIONS  (PRN):  acetaminophen    Suspension. 650 milliGRAM(s) Oral every 6 hours PRN Mild Pain (1 - 3)    Daily     Daily Weight in k (09 May 2018 13:34)    09 May 2018 07:01  -  10 May 2018 07:00  Vital Signs Last 24 Hrs  T(C): 36.4 (09 May 2018 21:07), Max: 36.8 (09 May 2018 13:54)  T(F): 97.6 (09 May 2018 21:07), Max: 98.3 (09 May 2018 13:54)  HR: 85 (09 May 2018 21:07) (78 - 85)  BP: 143/85 (09 May 2018 21:07) (125/64 - 143/85)  BP(mean): --  RR: 18 (09 May 2018 21:07) (18 - 18)  SpO2: 100% (09 May 2018 21:07) (97% - 100%)    CAPILLARY BLOOD GLUCOSE    GENERAL: NAD  HEAD:  Atraumatic, Normocephalic  EYES: EOMI, conjunctiva and sclera clear  NECK: Supple, No JVD  CHEST/LUNG: +crackles at bases b/l  HEART: Regular rate and rhythm; +systolic murmur  ABDOMEN: Soft, Nontender, Nondistended; Bowel sounds present  LOWER EXTREMITIES:  2+ pitting edema LE, 2+ Peripheral Pulses, No clubbing, cyanosis of LE.   UPPER EXTREMITIES: Left finger with erythema and swelling, dec ROM, and mild TTP; left wrist mildly swollen, decreased ROM, and no TTP.  PSYCH: AAOx2  NEUROLOGY: non-focal  SKIN: erythema of left finger, dry skin on feet, face and arms, no other rashes, lesions or open wounds    LABS:                                      11.4   5.34  )-----------( 176      ( 10 May 2018 05:15 )             36.0     05-10    144  |  102  |  14  ----------------------------<  108<H>  3.1<L>   |  30  |  0.69    Ca    8.9      10 May 2018 05:15  Phos  2.7     05-10  Mg     1.5     05-10    TPro  5.8<L>  /  Alb  2.8<L>  /  TBili  0.7  /  DBili  x   /  AST  24  /  ALT  12  /  AlkPhos  63  05-09      PT/INR - ( 09 May 2018 06:25 )   PT: 35.9 SEC;   INR: 3.05          PTT - ( 08 May 2018 10:00 )  PTT:33.0 SEC      Urinalysis Basic - ( 08 May 2018 15:23 )    Color: YELLOW / Appearance: TURBID / S.020 / pH: 6.0  Gluc: NEGATIVE / Ketone: NEGATIVE  / Bili: NEGATIVE / Urobili: NORMAL mg/dL   Blood: LARGE / Protein: 100 mg/dL / Nitrite: NEGATIVE   Leuk Esterase: NEGATIVE / RBC: >50 / WBC 5-10   Sq Epi: x / Non Sq Epi: x / Bacteria: D          RESPIRATORY  VENT:    ABG:     VBG:     RADIOLOGY & ADDITIONAL TESTS:    Imaging Personally Reviewed:    Consultant(s) Notes Reviewed:      Care Discussed with Consultants/Other Providers: Patient is a 88y old  Female who presents with a chief complaint of came for Pain , redness and swelling in the left hand, 8-10 pain now (08 May 2018 13:37)      SUBJECTIVE / OVERNIGHT EVENTS:  No overnight events. Finger and wrist are less painful and red. Patient denies fevers, chills, sob, chest pain. She notes LE edema is improved.       MEDICATIONS  (STANDING):  ATENolol  Tablet 50 milliGRAM(s) Oral daily  ceFAZolin   IVPB 1000 milliGRAM(s) IV Intermittent every 8 hours  furosemide    Tablet 40 milliGRAM(s) Oral two times a day  levothyroxine 112 MICROGram(s) Oral daily  pantoprazole    Tablet 40 milliGRAM(s) Oral before breakfast    MEDICATIONS  (PRN):  acetaminophen    Suspension. 650 milliGRAM(s) Oral every 6 hours PRN Mild Pain (1 - 3)    Daily     Daily Weight in k (09 May 2018 13:34)    09 May 2018 07:01  -  10 May 2018 07:00  Vital Signs Last 24 Hrs  T(C): 36.4 (09 May 2018 21:07), Max: 36.8 (09 May 2018 13:54)  T(F): 97.6 (09 May 2018 21:07), Max: 98.3 (09 May 2018 13:54)  HR: 85 (09 May 2018 21:07) (78 - 85)  BP: 143/85 (09 May 2018 21:07) (125/64 - 143/85)  BP(mean): --  RR: 18 (09 May 2018 21:07) (18 - 18)  SpO2: 100% (09 May 2018 21:07) (97% - 100%)    CAPILLARY BLOOD GLUCOSE    GENERAL: NAD  HEAD:  Atraumatic, Normocephalic  EYES: EOMI, conjunctiva and sclera clear  NECK: Supple, No JVD  CHEST/LUNG: +crackles at bases b/l  HEART: Regular rate and rhythm; +systolic murmur  ABDOMEN: Soft, Nontender, Nondistended; Bowel sounds present  LOWER EXTREMITIES:  2+ pitting edema LE up to mid calf-- significantly improved from prior, 2+ Peripheral Pulses, No clubbing, cyanosis of LE.   UPPER EXTREMITIES: Left finger without erythema, only TTP deep palpation, ROM improved, Left wrist without swelling, no TTP, no erythema.   PSYCH: AAOx2  NEUROLOGY: non-focal  SKIN: no rashes or lesions    LABS:                                      11.4   5.34  )-----------( 176      ( 10 May 2018 05:15 )             36.0     05-10    144  |  102  |  14  ----------------------------<  108<H>  3.1<L>   |  30  |  0.69    Ca    8.9      10 May 2018 05:15  Phos  2.7     05-10  Mg     1.5     05-10    TPro  5.8<L>  /  Alb  2.8<L>  /  TBili  0.7  /  DBili  x   /  AST  24  /  ALT  12  /  AlkPhos  63  05-09      PT/INR - ( 09 May 2018 06:25 )   PT: 35.9 SEC;   INR: 3.05          PTT - ( 08 May 2018 10:00 )  PTT:33.0 SEC      Urinalysis Basic - ( 08 May 2018 15:23 )    Color: YELLOW / Appearance: TURBID / S.020 / pH: 6.0  Gluc: NEGATIVE / Ketone: NEGATIVE  / Bili: NEGATIVE / Urobili: NORMAL mg/dL   Blood: LARGE / Protein: 100 mg/dL / Nitrite: NEGATIVE   Leuk Esterase: NEGATIVE / RBC: >50 / WBC 5-10   Sq Epi: x / Non Sq Epi: x / Bacteria: D          RESPIRATORY  VENT:    ABG:     VBG:     RADIOLOGY & ADDITIONAL TESTS:    Imaging Personally Reviewed:    Consultant(s) Notes Reviewed:      Care Discussed with Consultants/Other Providers:

## 2018-05-10 NOTE — PROGRESS NOTE ADULT - ATTENDING COMMENTS
Patient seen and examined. Agree with above note by resident.    #Cellulitis of left index finger and wrist - no open wounds, no scabs noted, no reported trauma per patient or HHA. Decreased ROM noted on exam. Given erythema, warmth and tenderness, will treat for non purulent cellulitis. Low suspicion for MDR organism at this time. Will treat with cefazolin for skin/soft tissue infection. No fractures noted on xray. No signs of bone involvement. Will check ESR and CRP. Clinically stable today.    #Sepsis - patient with fever and tachycardia on admission. Likely due to cellulitis. CXR and UA unremarkable. No URI like symptoms. No other signs or symptoms of infection noted. Sepsis has now resolved.     #Afib - on a/c. Monitor INR. Supratherapeutic INR today. Hold coumadin.     # h/o HFpEF - patient with LE edema and mild basilar crackles. Restart lasix and increase dose to 40BIDPO. Monitor daily weights.      Plan discussed with team.
Patient seen and examined. Agree with above note by resident.    #Cellulitis of left index finger and wrist - Cellulitis improved with ancef. Switch to PO keflex. Treat with 5 day course.     #Sepsis - patient with fever and tachycardia on admission. Likely due to cellulitis. CXR and UA unremarkable. No URI like symptoms. No other signs or symptoms of infection noted. Sepsis has now resolved.     #Afib - on a/c. Monitor INR. Dose coumadin.      # h/o HFpEF - patient with LE edema and mild basilar crackles. Restart lasix and increase dose to 40BIDPO. Monitor daily weights.      Plan discussed with team. Patient needs outpt follow up for BMP monitoring. DC home. Time spent 30 mins

## 2018-05-10 NOTE — PROGRESS NOTE ADULT - PROBLEM SELECTOR PLAN 5
Documented in PMD's note in [employer]scripts that patient has a hx of heart failure.  However, the last TTE in system is in 2012. EF 62%, mild AS, normal LV systolic function, normal RV systolic function  - Pt with crackles on exam and 2+ pitting edema  - c/w furosemide 40 PO BID  - daily weights Documented in PMD's note in allscripts that patient has a hx of heart failure.  However, the last TTE in system is in 2012. EF 62%, mild AS, normal LV systolic function, normal RV systolic function  - Pt with crackles on exam and 2+ pitting edema  - c/w daily weights  - c/w furosemide 40 PO BID  - Patient will need a BMP w Mg and phos in 1 week for electrolyte abnormalities in the setting of diuresis.

## 2018-05-11 ENCOUNTER — APPOINTMENT (OUTPATIENT)
Dept: HOME HEALTH SERVICES | Facility: HOME HEALTH | Age: 83
End: 2018-05-11

## 2018-05-11 VITALS
DIASTOLIC BLOOD PRESSURE: 80 MMHG | TEMPERATURE: 97.9 F | SYSTOLIC BLOOD PRESSURE: 100 MMHG | RESPIRATION RATE: 16 BRPM | HEART RATE: 70 BPM | OXYGEN SATURATION: 96 %

## 2018-05-13 LAB
BACTERIA BLD CULT: SIGNIFICANT CHANGE UP
BACTERIA BLD CULT: SIGNIFICANT CHANGE UP

## 2018-05-16 LAB
ALBUMIN SERPL ELPH-MCNC: 3.3 G/DL
ANION GAP SERPL CALC-SCNC: 16 MMOL/L
BUN SERPL-MCNC: 20 MG/DL
CALCIUM SERPL-MCNC: 9.1 MG/DL
CHLORIDE SERPL-SCNC: 101 MMOL/L
CO2 SERPL-SCNC: 30 MMOL/L
CREAT SERPL-MCNC: 0.84 MG/DL
GLUCOSE SERPL-MCNC: 113 MG/DL
INR PPP: 1.97 RATIO
PHOSPHATE SERPL-MCNC: 3.3 MG/DL
POTASSIUM SERPL-SCNC: 3.9 MMOL/L
PT BLD: 22.6 SEC
SODIUM SERPL-SCNC: 147 MMOL/L

## 2018-05-21 ENCOUNTER — CLINICAL ADVICE (OUTPATIENT)
Age: 83
End: 2018-05-21

## 2018-05-21 ENCOUNTER — MEDICATION RENEWAL (OUTPATIENT)
Age: 83
End: 2018-05-21

## 2018-05-30 ENCOUNTER — CLINICAL ADVICE (OUTPATIENT)
Age: 83
End: 2018-05-30

## 2018-05-31 ENCOUNTER — RX RENEWAL (OUTPATIENT)
Age: 83
End: 2018-05-31

## 2018-06-05 ENCOUNTER — MEDICATION RENEWAL (OUTPATIENT)
Age: 83
End: 2018-06-05

## 2018-06-05 LAB
INR PPP: 3.43 RATIO
PT BLD: 39.7 SEC

## 2018-06-15 ENCOUNTER — APPOINTMENT (OUTPATIENT)
Age: 83
End: 2018-06-15

## 2018-06-15 ENCOUNTER — INPATIENT (INPATIENT)
Facility: HOSPITAL | Age: 83
LOS: 5 days | Discharge: HOME HEALTH | End: 2018-06-21
Attending: HOSPITALIST | Admitting: HOSPITALIST
Payer: MEDICARE

## 2018-06-15 ENCOUNTER — APPOINTMENT (OUTPATIENT)
Dept: HOME HEALTH SERVICES | Facility: HOME HEALTH | Age: 83
End: 2018-06-15

## 2018-06-15 VITALS
TEMPERATURE: 101.4 F | DIASTOLIC BLOOD PRESSURE: 60 MMHG | HEART RATE: 111 BPM | SYSTOLIC BLOOD PRESSURE: 90 MMHG | RESPIRATION RATE: 17 BRPM | OXYGEN SATURATION: 90 %

## 2018-06-15 VITALS
HEART RATE: 106 BPM | DIASTOLIC BLOOD PRESSURE: 64 MMHG | RESPIRATION RATE: 16 BRPM | OXYGEN SATURATION: 95 % | TEMPERATURE: 98 F | SYSTOLIC BLOOD PRESSURE: 118 MMHG

## 2018-06-15 DIAGNOSIS — R31.9 HEMATURIA, UNSPECIFIED: ICD-10-CM

## 2018-06-15 DIAGNOSIS — I48.2 CHRONIC ATRIAL FIBRILLATION: ICD-10-CM

## 2018-06-15 DIAGNOSIS — A41.9 SEPSIS, UNSPECIFIED ORGANISM: ICD-10-CM
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
## 2018-06-15 DIAGNOSIS — R53.1 WEAKNESS: ICD-10-CM

## 2018-06-15 DIAGNOSIS — M79.89 OTHER SPECIFIED SOFT TISSUE DISORDERS: ICD-10-CM

## 2018-06-15 DIAGNOSIS — E03.9 HYPOTHYROIDISM, UNSPECIFIED: ICD-10-CM

## 2018-06-15 LAB
ALBUMIN SERPL ELPH-MCNC: 3.2 G/DL — LOW (ref 3.3–5)
ALP SERPL-CCNC: 76 U/L — SIGNIFICANT CHANGE UP (ref 40–120)
ALT FLD-CCNC: 9 U/L — SIGNIFICANT CHANGE UP (ref 4–33)
APPEARANCE UR: SIGNIFICANT CHANGE UP
APTT BLD: 35.8 SEC — SIGNIFICANT CHANGE UP (ref 27.5–37.4)
AST SERPL-CCNC: 21 U/L — SIGNIFICANT CHANGE UP (ref 4–32)
B PERT DNA SPEC QL NAA+PROBE: SIGNIFICANT CHANGE UP
BASE EXCESS BLDV CALC-SCNC: 9.9 MMOL/L — SIGNIFICANT CHANGE UP
BASOPHILS # BLD AUTO: 0.05 K/UL — SIGNIFICANT CHANGE UP (ref 0–0.2)
BASOPHILS NFR BLD AUTO: 0.7 % — SIGNIFICANT CHANGE UP (ref 0–2)
BILIRUB SERPL-MCNC: 0.7 MG/DL — SIGNIFICANT CHANGE UP (ref 0.2–1.2)
BILIRUB UR-MCNC: NEGATIVE — SIGNIFICANT CHANGE UP
BLOOD GAS VENOUS - CREATININE: 0.81 MG/DL — SIGNIFICANT CHANGE UP (ref 0.5–1.3)
BLOOD UR QL VISUAL: HIGH
BUN SERPL-MCNC: 14 MG/DL — SIGNIFICANT CHANGE UP (ref 7–23)
C PNEUM DNA SPEC QL NAA+PROBE: NOT DETECTED — SIGNIFICANT CHANGE UP
CALCIUM SERPL-MCNC: 8.8 MG/DL — SIGNIFICANT CHANGE UP (ref 8.4–10.5)
CHLORIDE BLDV-SCNC: 104 MMOL/L — SIGNIFICANT CHANGE UP (ref 96–108)
CHLORIDE SERPL-SCNC: 99 MMOL/L — SIGNIFICANT CHANGE UP (ref 98–107)
CO2 SERPL-SCNC: 28 MMOL/L — SIGNIFICANT CHANGE UP (ref 22–31)
COLOR SPEC: HIGH
CREAT SERPL-MCNC: 0.81 MG/DL — SIGNIFICANT CHANGE UP (ref 0.5–1.3)
EOSINOPHIL # BLD AUTO: 0.05 K/UL — SIGNIFICANT CHANGE UP (ref 0–0.5)
EOSINOPHIL NFR BLD AUTO: 0.7 % — SIGNIFICANT CHANGE UP (ref 0–6)
FLUAV H1 2009 PAND RNA SPEC QL NAA+PROBE: NOT DETECTED — SIGNIFICANT CHANGE UP
FLUAV H1 RNA SPEC QL NAA+PROBE: NOT DETECTED — SIGNIFICANT CHANGE UP
FLUAV H3 RNA SPEC QL NAA+PROBE: NOT DETECTED — SIGNIFICANT CHANGE UP
FLUAV SUBTYP SPEC NAA+PROBE: SIGNIFICANT CHANGE UP
FLUBV RNA SPEC QL NAA+PROBE: NOT DETECTED — SIGNIFICANT CHANGE UP
GAS PNL BLDV: 135 MMOL/L — LOW (ref 136–146)
GLUCOSE BLDV-MCNC: 101 — HIGH (ref 70–99)
GLUCOSE SERPL-MCNC: 95 MG/DL — SIGNIFICANT CHANGE UP (ref 70–99)
GLUCOSE UR-MCNC: NEGATIVE — SIGNIFICANT CHANGE UP
HADV DNA SPEC QL NAA+PROBE: NOT DETECTED — SIGNIFICANT CHANGE UP
HCO3 BLDV-SCNC: 31 MMOL/L — HIGH (ref 20–27)
HCOV 229E RNA SPEC QL NAA+PROBE: NOT DETECTED — SIGNIFICANT CHANGE UP
HCOV HKU1 RNA SPEC QL NAA+PROBE: NOT DETECTED — SIGNIFICANT CHANGE UP
HCOV NL63 RNA SPEC QL NAA+PROBE: NOT DETECTED — SIGNIFICANT CHANGE UP
HCOV OC43 RNA SPEC QL NAA+PROBE: NOT DETECTED — SIGNIFICANT CHANGE UP
HCT VFR BLD CALC: 35.1 % — SIGNIFICANT CHANGE UP (ref 34.5–45)
HCT VFR BLDV CALC: 36.5 % — SIGNIFICANT CHANGE UP (ref 34.5–45)
HGB BLD-MCNC: 11.5 G/DL — SIGNIFICANT CHANGE UP (ref 11.5–15.5)
HGB BLDV-MCNC: 11.8 G/DL — SIGNIFICANT CHANGE UP (ref 11.5–15.5)
HMPV RNA SPEC QL NAA+PROBE: NOT DETECTED — SIGNIFICANT CHANGE UP
HPIV1 RNA SPEC QL NAA+PROBE: NOT DETECTED — SIGNIFICANT CHANGE UP
HPIV2 RNA SPEC QL NAA+PROBE: NOT DETECTED — SIGNIFICANT CHANGE UP
HPIV3 RNA SPEC QL NAA+PROBE: NOT DETECTED — SIGNIFICANT CHANGE UP
HPIV4 RNA SPEC QL NAA+PROBE: NOT DETECTED — SIGNIFICANT CHANGE UP
IMM GRANULOCYTES # BLD AUTO: 0.03 # — SIGNIFICANT CHANGE UP
IMM GRANULOCYTES NFR BLD AUTO: 0.4 % — SIGNIFICANT CHANGE UP (ref 0–1.5)
INR BLD: 2.93 — HIGH (ref 0.88–1.17)
KETONES UR-MCNC: SIGNIFICANT CHANGE UP
LACTATE BLDV-MCNC: 1.1 MMOL/L — SIGNIFICANT CHANGE UP (ref 0.5–2)
LEUKOCYTE ESTERASE UR-ACNC: NEGATIVE — SIGNIFICANT CHANGE UP
LYMPHOCYTES # BLD AUTO: 0.77 K/UL — LOW (ref 1–3.3)
LYMPHOCYTES # BLD AUTO: 10.5 % — LOW (ref 13–44)
M PNEUMO DNA SPEC QL NAA+PROBE: NOT DETECTED — SIGNIFICANT CHANGE UP
MCHC RBC-ENTMCNC: 29.3 PG — SIGNIFICANT CHANGE UP (ref 27–34)
MCHC RBC-ENTMCNC: 32.8 % — SIGNIFICANT CHANGE UP (ref 32–36)
MCV RBC AUTO: 89.3 FL — SIGNIFICANT CHANGE UP (ref 80–100)
MONOCYTES # BLD AUTO: 0.65 K/UL — SIGNIFICANT CHANGE UP (ref 0–0.9)
MONOCYTES NFR BLD AUTO: 8.9 % — SIGNIFICANT CHANGE UP (ref 2–14)
MUCOUS THREADS # UR AUTO: SIGNIFICANT CHANGE UP
NEUTROPHILS # BLD AUTO: 5.75 K/UL — SIGNIFICANT CHANGE UP (ref 1.8–7.4)
NEUTROPHILS NFR BLD AUTO: 78.8 % — HIGH (ref 43–77)
NITRITE UR-MCNC: NEGATIVE — SIGNIFICANT CHANGE UP
NRBC # FLD: 0 — SIGNIFICANT CHANGE UP
PCO2 BLDV: 53 MMHG — HIGH (ref 41–51)
PH BLDV: 7.43 PH — SIGNIFICANT CHANGE UP (ref 7.32–7.43)
PH UR: 5.5 — SIGNIFICANT CHANGE UP (ref 4.6–8)
PLATELET # BLD AUTO: 189 K/UL — SIGNIFICANT CHANGE UP (ref 150–400)
PMV BLD: 10 FL — SIGNIFICANT CHANGE UP (ref 7–13)
PO2 BLDV: < 24 MMHG — LOW (ref 35–40)
POTASSIUM BLDV-SCNC: 3.8 MMOL/L — SIGNIFICANT CHANGE UP (ref 3.4–4.5)
POTASSIUM SERPL-MCNC: 4.1 MMOL/L — SIGNIFICANT CHANGE UP (ref 3.5–5.3)
POTASSIUM SERPL-SCNC: 4.1 MMOL/L — SIGNIFICANT CHANGE UP (ref 3.5–5.3)
PROT SERPL-MCNC: 7 G/DL — SIGNIFICANT CHANGE UP (ref 6–8.3)
PROT UR-MCNC: 100 MG/DL — HIGH
PROTHROM AB SERPL-ACNC: 34.5 SEC — HIGH (ref 9.8–13.1)
RBC # BLD: 3.93 M/UL — SIGNIFICANT CHANGE UP (ref 3.8–5.2)
RBC # FLD: 14.2 % — SIGNIFICANT CHANGE UP (ref 10.3–14.5)
RBC CASTS # UR COMP ASSIST: >50 — HIGH (ref 0–?)
RSV RNA SPEC QL NAA+PROBE: NOT DETECTED — SIGNIFICANT CHANGE UP
RV+EV RNA SPEC QL NAA+PROBE: NOT DETECTED — SIGNIFICANT CHANGE UP
SAO2 % BLDV: 24.7 % — LOW (ref 60–85)
SODIUM SERPL-SCNC: 140 MMOL/L — SIGNIFICANT CHANGE UP (ref 135–145)
SP GR SPEC: 1.02 — SIGNIFICANT CHANGE UP (ref 1–1.04)
SQUAMOUS # UR AUTO: SIGNIFICANT CHANGE UP
UROBILINOGEN FLD QL: 1 MG/DL — SIGNIFICANT CHANGE UP
WBC # BLD: 7.3 K/UL — SIGNIFICANT CHANGE UP (ref 3.8–10.5)
WBC # FLD AUTO: 7.3 K/UL — SIGNIFICANT CHANGE UP (ref 3.8–10.5)

## 2018-06-15 PROCEDURE — 99223 1ST HOSP IP/OBS HIGH 75: CPT

## 2018-06-15 PROCEDURE — 71045 X-RAY EXAM CHEST 1 VIEW: CPT | Mod: 26

## 2018-06-15 RX ORDER — SODIUM CHLORIDE 9 MG/ML
1000 INJECTION INTRAMUSCULAR; INTRAVENOUS; SUBCUTANEOUS ONCE
Qty: 0 | Refills: 0 | Status: COMPLETED | OUTPATIENT
Start: 2018-06-15 | End: 2018-06-15

## 2018-06-15 RX ORDER — CEFTRIAXONE 500 MG/1
1 INJECTION, POWDER, FOR SOLUTION INTRAMUSCULAR; INTRAVENOUS ONCE
Qty: 0 | Refills: 0 | Status: COMPLETED | OUTPATIENT
Start: 2018-06-15 | End: 2018-06-15

## 2018-06-15 RX ORDER — SODIUM CHLORIDE 9 MG/ML
1000 INJECTION INTRAMUSCULAR; INTRAVENOUS; SUBCUTANEOUS
Qty: 0 | Refills: 0 | Status: DISCONTINUED | OUTPATIENT
Start: 2018-06-15 | End: 2018-06-21

## 2018-06-15 RX ORDER — AZITHROMYCIN 500 MG/1
500 TABLET, FILM COATED ORAL EVERY 24 HOURS
Qty: 0 | Refills: 0 | Status: DISCONTINUED | OUTPATIENT
Start: 2018-06-16 | End: 2018-06-17

## 2018-06-15 RX ORDER — ACETAMINOPHEN 500 MG
650 TABLET ORAL EVERY 6 HOURS
Qty: 0 | Refills: 0 | Status: DISCONTINUED | OUTPATIENT
Start: 2018-06-15 | End: 2018-06-21

## 2018-06-15 RX ORDER — AZITHROMYCIN 500 MG/1
TABLET, FILM COATED ORAL
Qty: 0 | Refills: 0 | Status: DISCONTINUED | OUTPATIENT
Start: 2018-06-15 | End: 2018-06-17

## 2018-06-15 RX ORDER — LEVOTHYROXINE SODIUM 125 MCG
112 TABLET ORAL DAILY
Qty: 0 | Refills: 0 | Status: DISCONTINUED | OUTPATIENT
Start: 2018-06-15 | End: 2018-06-21

## 2018-06-15 RX ORDER — PANTOPRAZOLE SODIUM 20 MG/1
40 TABLET, DELAYED RELEASE ORAL
Qty: 0 | Refills: 0 | Status: DISCONTINUED | OUTPATIENT
Start: 2018-06-15 | End: 2018-06-21

## 2018-06-15 RX ORDER — CEFTRIAXONE 500 MG/1
1 INJECTION, POWDER, FOR SOLUTION INTRAMUSCULAR; INTRAVENOUS EVERY 24 HOURS
Qty: 0 | Refills: 0 | Status: DISCONTINUED | OUTPATIENT
Start: 2018-06-16 | End: 2018-06-17

## 2018-06-15 RX ORDER — LEVOTHYROXINE SODIUM 125 MCG
1 TABLET ORAL
Qty: 0 | Refills: 0 | COMMUNITY

## 2018-06-15 RX ORDER — OMEPRAZOLE 10 MG/1
1 CAPSULE, DELAYED RELEASE ORAL
Qty: 0 | Refills: 0 | COMMUNITY

## 2018-06-15 RX ORDER — CEFTRIAXONE 500 MG/1
INJECTION, POWDER, FOR SOLUTION INTRAMUSCULAR; INTRAVENOUS
Qty: 0 | Refills: 0 | Status: DISCONTINUED | OUTPATIENT
Start: 2018-06-15 | End: 2018-06-17

## 2018-06-15 RX ORDER — ACETAMINOPHEN 500 MG
650 TABLET ORAL ONCE
Qty: 0 | Refills: 0 | Status: COMPLETED | OUTPATIENT
Start: 2018-06-15 | End: 2018-06-15

## 2018-06-15 RX ORDER — AZITHROMYCIN 500 MG/1
500 TABLET, FILM COATED ORAL ONCE
Qty: 0 | Refills: 0 | Status: COMPLETED | OUTPATIENT
Start: 2018-06-15 | End: 2018-06-15

## 2018-06-15 RX ORDER — WARFARIN SODIUM 2.5 MG/1
1.5 TABLET ORAL ONCE
Qty: 0 | Refills: 0 | Status: COMPLETED | OUTPATIENT
Start: 2018-06-15 | End: 2018-06-15

## 2018-06-15 RX ADMIN — SODIUM CHLORIDE 1000 MILLILITER(S): 9 INJECTION INTRAMUSCULAR; INTRAVENOUS; SUBCUTANEOUS at 15:38

## 2018-06-15 RX ADMIN — Medication 650 MILLIGRAM(S): at 15:38

## 2018-06-15 NOTE — ED ADULT TRIAGE NOTE - CHIEF COMPLAINT QUOTE
Pt brought in by EMS from home for weakness, failure to thrive and non complaint with medications. Pt has a home health aide at bedside. Pt also noted to have bilateral leg edema.

## 2018-06-15 NOTE — ED PROVIDER NOTE - PROGRESS NOTE DETAILS
Alexandr PGY1: HHA brought out MOLST form, appears to be last updated 6/3 with retained DNR order, copy left in patient's chart. Spoke with daughter in law- concern that patient not taking her medications, not washing, refusing service, conveyed to her that she may be undergoing advancing dementia vs viral illness- rvp pending, pt elderly, anticoagulated with reported falling, goal to obs for resolution of symptoms/ po encouragement but will have to admit 2/2 not CDU candidate

## 2018-06-15 NOTE — H&P ADULT - NSHPLABSRESULTS_GEN_ALL_CORE
.  LABS:                         11.5   7.30  )-----------( 189      ( 15 Yoni 2018 15:00 )             35.1     06-15    140  |  99  |  14  ----------------------------<  95  4.1   |  28  |  0.81    Ca    8.8      15 Yoni 2018 15:00    TPro  7.0  /  Alb  3.2<L>  /  TBili  0.7  /  DBili  x   /  AST  21  /  ALT  9   /  AlkPhos  76  06-15    PT/INR - ( 15 Yoni 2018 15:00 )   PT: 34.5 SEC;   INR: 2.93          PTT - ( 15 Yoni 2018 15:00 )  PTT:35.8 SEC  Urinalysis Basic - ( 15 Yoni 2018 16:45 )    Color: PINK / Appearance: TURBID / S.019 / pH: 5.5  Gluc: NEGATIVE / Ketone: TRACE  / Bili: NEGATIVE / Urobili: 1 mg/dL   Blood: LARGE / Protein: 100 mg/dL / Nitrite: NEGATIVE   Leuk Esterase: NEGATIVE / RBC: >50 / WBC x   Sq Epi: OCC / Non Sq Epi: x / Bacteria: x        RADIOLOGY, EKG & ADDITIONAL TESTS: Reviewed.

## 2018-06-15 NOTE — ED PROVIDER NOTE - MEDICAL DECISION MAKING DETAILS
88F febrile in ED to 101.3 rectally, with cough, suspicion for pna. Reported weakness and failure to thrive vs worsening dementia, infectious w/u, eval saftey, possibly home vs admission IVF as pt very dry on appearance Wagner: 88F febrile in ED to 101.3 rectally, with cough, suspicion for pna. Reported weakness and failure to thrive vs worsening dementia, infectious w/u, eval saftey, possibly home vs admission IVF as pt very dry on appearance

## 2018-06-15 NOTE — H&P ADULT - HISTORY OF PRESENT ILLNESS
89 yo F dementia, A fib on coumadin, hypothyroid, lung cancer s/p chemo/RT in remission, GERD sent in by home aid for poor PO intake and cough. Pt unable to provide history, AAOx 2 to person and place but does not know why she is in the hospital. Unable to contact family at this time. Pt says she has no complaints. She does endorse falling at home 2 days ago- says she tripped on carpet , landed on her knees. Denies any LOC or head trauma. Says she has no pain since her fall.    In ED pt given tylenol 650mg PO and 1L NS   /55 90  97.5  15  96% on RA

## 2018-06-15 NOTE — H&P ADULT - NSHPPHYSICALEXAM_GEN_ALL_CORE
T(C): 36.4 (06-15-18 @ 20:39), Max: 38.5 (06-15-18 @ 15:00)  HR: 90 (06-15-18 @ 20:39) (83 - 108)  BP: 100/55 (06-15-18 @ 20:39) (96/63 - 118/64)  RR: 15 (06-15-18 @ 20:39) (15 - 16)  SpO2: 96% (06-15-18 @ 20:39) (95% - 96%)    GENERAL: No acute distress, well-developed  HEAD:  Atraumatic, Normocephalic  ENT: EOMI, PERRLA, conjunctiva and sclera clear, Neck supple, No JVD, moist mucosa  CHEST/LUNG: Clear to auscultation bilaterally; No wheeze, equal breath sounds bilaterally   HEART: Regular rate and rhythm; No murmurs, rubs, or gallops  ABDOMEN: Soft, Nontender, Nondistended; Bowel sounds present, no organomegaly  EXTREMITIES:  2+ Peripheral Pulses, No clubbing, cyanosis, RLE swelling   PSYCH: AAOx3, normal affect   NEUROLOGY: non-focal, moving all extremities   SKIN: Normal color, No rashes or lesions

## 2018-06-15 NOTE — ED PROVIDER NOTE - OBJECTIVE STATEMENT
Pt is a 87 y/o FM PMH mild dementia, chronic sinusitis, Afib on coumadin, CHF, hypothyroidism, lung CA (2008) s/p CT/RT in remission, GERD recent admission  left finger pain and swelling Pt is a 89 y/o FM PMH mild dementia, chronic sinusitis, Afib on coumadin, CHF, hypothyroidism, lung CA (2008) s/p CT/RT in remission, GERD recent admission  for left finger swelling, described by HHA to have poor po intake x 1 week with worsening weakness, cough, and now no intake x 2 days. Patient had a reported fall two days ago with right anterior shin redness and some swelling. No reported head trauma or LOC. Per pt, she cannot recall her medications/ medical issues. Lives alone with 24 hour care. Pt is a 89 y/o FM PMH mild dementia, chronic sinusitis, Afib on coumadin, ? CHF (no documented chf), hypothyroidism, lung CA (2008) s/p CT/RT in remission, GERD recent admission  for left finger swelling, described by HHA to have poor po intake x 1 week with worsening weakness, cough, and now no intake x 2 days. Patient had a reported fall two days ago with right anterior shin redness and some swelling. No reported head trauma or LOC. Per pt, she cannot recall her medications/ medical issues. Lives alone with 24 hour care. Medical hx from chart.     Daughter in law # [phone]

## 2018-06-15 NOTE — ED ADULT NURSE NOTE - OBJECTIVE STATEMENT
patient alert ox3 came in with HHA states that patient is not eating and drinking and not taking her meds since this morning. patient c/o feeling weak . febrile. denies any pain. endorses to mild cough. labs done as ordered. skin warm and d ry. swilling to right LE noted. connected to CM shows Afib. meds given as ordered. awaiting results and re eval.

## 2018-06-15 NOTE — ED PROVIDER NOTE - CARE PLAN
Principal Discharge DX:	Weakness  Secondary Diagnosis:	Fever  Secondary Diagnosis:	Failure to thrive in adult

## 2018-06-15 NOTE — ED PROVIDER NOTE - MUSCULOSKELETAL, MLM
Spine appears normal, range of motion is not limited, mild tenderness to swelling over anterior right shin

## 2018-06-15 NOTE — H&P ADULT - PROBLEM SELECTOR PLAN 3
- Pt states she tripped on rug, no LOC or presyncopal symptoms  - Fall precautions  - PT consult in am

## 2018-06-15 NOTE — H&P ADULT - ASSESSMENT
87 yo F dementia, A fib on coumadin, hypothyroid, lung cancer s/p chemo/RT in remission, GERD sent in by home aid for poor PO intake, weakness and cough.

## 2018-06-15 NOTE — H&P ADULT - PROBLEM SELECTOR PLAN 2
- May be secondary to PNA. Per ED attending R lower consolidation noted on bedside US  and pt presented with cough  - Will treat for PNA - May be secondary to PNA. Per ED attending, R lower consolidation noted on bedside US and pt presented with cough. RVP negative  - Will treat for PNA. Ceftriaxone, azithromycin  - f/u blood cultures

## 2018-06-16 LAB
BASOPHILS # BLD AUTO: 0.04 K/UL — SIGNIFICANT CHANGE UP (ref 0–0.2)
BASOPHILS NFR BLD AUTO: 0.8 % — SIGNIFICANT CHANGE UP (ref 0–2)
EOSINOPHIL # BLD AUTO: 0.12 K/UL — SIGNIFICANT CHANGE UP (ref 0–0.5)
EOSINOPHIL NFR BLD AUTO: 2.3 % — SIGNIFICANT CHANGE UP (ref 0–6)
HCT VFR BLD CALC: 33.8 % — LOW (ref 34.5–45)
HGB BLD-MCNC: 10.6 G/DL — LOW (ref 11.5–15.5)
IMM GRANULOCYTES # BLD AUTO: 0.03 # — SIGNIFICANT CHANGE UP
IMM GRANULOCYTES NFR BLD AUTO: 0.6 % — SIGNIFICANT CHANGE UP (ref 0–1.5)
INR BLD: 3.03 — HIGH (ref 0.88–1.17)
LYMPHOCYTES # BLD AUTO: 0.75 K/UL — LOW (ref 1–3.3)
LYMPHOCYTES # BLD AUTO: 14.5 % — SIGNIFICANT CHANGE UP (ref 13–44)
MCHC RBC-ENTMCNC: 29.4 PG — SIGNIFICANT CHANGE UP (ref 27–34)
MCHC RBC-ENTMCNC: 31.4 % — LOW (ref 32–36)
MCV RBC AUTO: 93.6 FL — SIGNIFICANT CHANGE UP (ref 80–100)
MONOCYTES # BLD AUTO: 0.47 K/UL — SIGNIFICANT CHANGE UP (ref 0–0.9)
MONOCYTES NFR BLD AUTO: 9.1 % — SIGNIFICANT CHANGE UP (ref 2–14)
NEUTROPHILS # BLD AUTO: 3.78 K/UL — SIGNIFICANT CHANGE UP (ref 1.8–7.4)
NEUTROPHILS NFR BLD AUTO: 72.7 % — SIGNIFICANT CHANGE UP (ref 43–77)
NRBC # FLD: 0 — SIGNIFICANT CHANGE UP
PLATELET # BLD AUTO: 159 K/UL — SIGNIFICANT CHANGE UP (ref 150–400)
PMV BLD: 10.3 FL — SIGNIFICANT CHANGE UP (ref 7–13)
PROTHROM AB SERPL-ACNC: 35.7 SEC — HIGH (ref 9.8–13.1)
RBC # BLD: 3.61 M/UL — LOW (ref 3.8–5.2)
RBC # FLD: 14 % — SIGNIFICANT CHANGE UP (ref 10.3–14.5)
SPECIMEN SOURCE: SIGNIFICANT CHANGE UP
SPECIMEN SOURCE: SIGNIFICANT CHANGE UP
WBC # BLD: 5.19 K/UL — SIGNIFICANT CHANGE UP (ref 3.8–10.5)
WBC # FLD AUTO: 5.19 K/UL — SIGNIFICANT CHANGE UP (ref 3.8–10.5)

## 2018-06-16 PROCEDURE — 99233 SBSQ HOSP IP/OBS HIGH 50: CPT

## 2018-06-16 RX ADMIN — SODIUM CHLORIDE 100 MILLILITER(S): 9 INJECTION INTRAMUSCULAR; INTRAVENOUS; SUBCUTANEOUS at 01:30

## 2018-06-16 RX ADMIN — SODIUM CHLORIDE 100 MILLILITER(S): 9 INJECTION INTRAMUSCULAR; INTRAVENOUS; SUBCUTANEOUS at 11:51

## 2018-06-16 RX ADMIN — Medication 112 MICROGRAM(S): at 08:19

## 2018-06-16 RX ADMIN — CEFTRIAXONE 100 GRAM(S): 500 INJECTION, POWDER, FOR SOLUTION INTRAMUSCULAR; INTRAVENOUS at 01:30

## 2018-06-16 RX ADMIN — WARFARIN SODIUM 1.5 MILLIGRAM(S): 2.5 TABLET ORAL at 00:34

## 2018-06-16 RX ADMIN — AZITHROMYCIN 250 MILLIGRAM(S): 500 TABLET, FILM COATED ORAL at 00:34

## 2018-06-16 RX ADMIN — AZITHROMYCIN 250 MILLIGRAM(S): 500 TABLET, FILM COATED ORAL at 23:03

## 2018-06-16 RX ADMIN — CEFTRIAXONE 100 GRAM(S): 500 INJECTION, POWDER, FOR SOLUTION INTRAMUSCULAR; INTRAVENOUS at 23:01

## 2018-06-16 RX ADMIN — PANTOPRAZOLE SODIUM 40 MILLIGRAM(S): 20 TABLET, DELAYED RELEASE ORAL at 08:19

## 2018-06-16 NOTE — PHYSICAL THERAPY INITIAL EVALUATION ADULT - PERTINENT HX OF CURRENT PROBLEM, REHAB EVAL
Pt. admitted for poor po intake, cough and weakness. PMH of dementia, A fib on coumadin, hypothyroid, lung cancer s/p chemo/RT in remission, GERD sent in by home aid for poor PO intake and cough.

## 2018-06-16 NOTE — PROGRESS NOTE ADULT - PROBLEM SELECTOR PLAN 2
- May be secondary to PNA. Per ED attending, R lower consolidation noted on bedside US and pt presented with cough. RVP negative  - Will treat for PNA. Ceftriaxone, azithromycin  - f/u blood cultures

## 2018-06-16 NOTE — PHYSICAL THERAPY INITIAL EVALUATION ADULT - CRITERIA FOR SKILLED THERAPEUTIC INTERVENTIONS
therapy frequency/anticipated discharge recommendation/risk reduction/prevention/rehab potential/impairments found/predicted duration of therapy intervention

## 2018-06-16 NOTE — PROGRESS NOTE ADULT - SUBJECTIVE AND OBJECTIVE BOX
CHIEF COMPLAINT: Patient is a 88y old  female who presents with a chief complaint of poor po intake, cough and weakness (15 Yoni 2018 23:09)      SUBJECTIVE / OVERNIGHT EVENTS:    Denies any complaints. Denies abdominal pain. Denies dysuria. Denies cough.    MEDICATIONS  (STANDING):  azithromycin  IVPB      azithromycin  IVPB 500 milliGRAM(s) IV Intermittent every 24 hours  cefTRIAXone   IVPB 1 Gram(s) IV Intermittent every 24 hours  cefTRIAXone   IVPB      levothyroxine 112 MICROGram(s) Oral daily  pantoprazole    Tablet 40 milliGRAM(s) Oral before breakfast  sodium chloride 0.9%. 1000 milliLiter(s) (100 mL/Hr) IV Continuous <Continuous>    MEDICATIONS  (PRN):  acetaminophen   Tablet 650 milliGRAM(s) Oral every 6 hours PRN For Temp greater than 38 C (100.4 F)      VITALS:  T(F): 97.9 (18 @ 14:20), Max: 98.1 (18 @ 05:32)  HR: 88 (18 @ 14:20) (87 - 90)  BP: 133/75 (18 @ 14:20) (100/55 - 133/75)  RR: 18 (18 @ 14:20) (15 - 18)  SpO2: 97% (18 @ 14:20)  Height (cm): 157.48 (23:53)  Weight (kg): 54.8 (23:53)  BMI (kg/m2): 22.1 (23:53)    CAPILLARY BLOOD GLUCOSE    Output   Height (cm): 157.48 (23:53)  Weight (kg): 54.8 (23:53)  BMI (kg/m2): 22.1 (23:53)  I&O's Summary  T(F): 97.9 (18 @ 14:20), Max: 98.1 (18 @ 05:32)  HR: 88 (18 @ 14:20) (87 - 90)  BP: 133/75 (1618 @ 14:20) (100/55 - 133/75)  RR: 18 (18 @ 14:20) (15 - 18)  SpO2: 97% (18 @ 14:20)    PHYSICAL EXAM:  GENERAL: NAD, well-developed  HEAD:  Atraumatic, Normocephalic  EYES: EOMI  NECK: Supple, No JVD  CHEST/LUNG: nonlabored breathing  HEART: nl S1/S2  ABDOMEN: nondistended, soft  EXTREMITIES:  no LE edema  PSYCH: alert, answering questions appropriately  NEUROLOGY: non-focal  SKIN: No rashes noted    LABS:              10.6                 x    | x    | x            5.19  >-----------< 159     ------------------------< x                     33.8                 x    | x    | x                                            Ca x     Mg x     Ph x           TPro  7.0  /  Alb  3.2      TBili  0.7  /  DBili  x         AST  21  /  ALT  9             AlkPhos  76      INR: 3.03<H>;    PT: 35.7 SEC<H>;    PTT: x            Urinalysis Basic - ( 15 Yoni 2018 16:45 )    Color: PINK / Appearance: TURBID / S.019 / pH: 5.5  Gluc: NEGATIVE / Ketone: TRACE  / Bili: NEGATIVE / Urobili: 1 mg/dL   Blood: LARGE / Protein: 100 mg/dL / Nitrite: NEGATIVE   Leuk Esterase: NEGATIVE / RBC: >50 / WBC x   Sq Epi: OCC / Non Sq Epi: x / Bacteria: x        VB-15 @ 15:00  7.43/53/< 24/31/24.7/9.9        MICROBIOLOGY:    Culture - Blood (collected 15 Yoni 2018 15:33)  Source: BLOOD VENOUS  Preliminary Report (2018 15:33):    NO ORGANISMS ISOLATED    NO ORGANISMS ISOLATED AT 24 HOURS    Culture - Blood (collected 15 Yoni 2018 15:33)  Source: BLOOD PERIPHERAL  Preliminary Report (2018 15:33):    NO ORGANISMS ISOLATED    NO ORGANISMS ISOLATED AT 24 HOURS          RADIOLOGY & ADDITIONAL TESTS:    Imaging Personally Reviewed:    < from: Xray Chest 1 View AP/PA (06.15.18 @ 17:02) >  IMPRESSION:   Clear lungs.    < end of copied text >      [] Consultant(s) Notes Reviewed:    [] Care Discussed with Consultants/Other Providers:

## 2018-06-16 NOTE — PHYSICAL THERAPY INITIAL EVALUATION ADULT - GENERAL OBSERVATIONS, REHAB EVAL
Consult received, chart reviewed. Patient received standing at bedside with MICHELLE Badillo. Patient agreed to Evaluation from Physical Therapist.

## 2018-06-16 NOTE — PHYSICAL THERAPY INITIAL EVALUATION ADULT - ADDITIONAL COMMENTS
Pt. A+Ox2, poor historian. States she lives in a house with her grandson. PT to follow up on previous level of function.     Pt. was left seated in chair post PT Evaluation, NAD, +chair alarm. SERVANDO Gordon present and aware of pt. status/participation in PT.

## 2018-06-17 LAB
BACTERIA UR CULT: SIGNIFICANT CHANGE UP
BUN SERPL-MCNC: 14 MG/DL — SIGNIFICANT CHANGE UP (ref 7–23)
CALCIUM SERPL-MCNC: 8.7 MG/DL — SIGNIFICANT CHANGE UP (ref 8.4–10.5)
CHLORIDE SERPL-SCNC: 103 MMOL/L — SIGNIFICANT CHANGE UP (ref 98–107)
CO2 SERPL-SCNC: 27 MMOL/L — SIGNIFICANT CHANGE UP (ref 22–31)
CREAT SERPL-MCNC: 0.7 MG/DL — SIGNIFICANT CHANGE UP (ref 0.5–1.3)
GLUCOSE SERPL-MCNC: 96 MG/DL — SIGNIFICANT CHANGE UP (ref 70–99)
HCT VFR BLD CALC: 34.1 % — LOW (ref 34.5–45)
HGB BLD-MCNC: 11 G/DL — LOW (ref 11.5–15.5)
INR BLD: 2.3 — HIGH (ref 0.88–1.17)
MAGNESIUM SERPL-MCNC: 1.8 MG/DL — SIGNIFICANT CHANGE UP (ref 1.6–2.6)
MCHC RBC-ENTMCNC: 28.9 PG — SIGNIFICANT CHANGE UP (ref 27–34)
MCHC RBC-ENTMCNC: 32.3 % — SIGNIFICANT CHANGE UP (ref 32–36)
MCV RBC AUTO: 89.7 FL — SIGNIFICANT CHANGE UP (ref 80–100)
NRBC # FLD: 0 — SIGNIFICANT CHANGE UP
PHOSPHATE SERPL-MCNC: 2.5 MG/DL — SIGNIFICANT CHANGE UP (ref 2.5–4.5)
PLATELET # BLD AUTO: 180 K/UL — SIGNIFICANT CHANGE UP (ref 150–400)
PMV BLD: 10.1 FL — SIGNIFICANT CHANGE UP (ref 7–13)
POTASSIUM SERPL-MCNC: 3.3 MMOL/L — LOW (ref 3.5–5.3)
POTASSIUM SERPL-SCNC: 3.3 MMOL/L — LOW (ref 3.5–5.3)
PROTHROM AB SERPL-ACNC: 26 SEC — HIGH (ref 9.8–13.1)
RBC # BLD: 3.8 M/UL — SIGNIFICANT CHANGE UP (ref 3.8–5.2)
RBC # FLD: 14.1 % — SIGNIFICANT CHANGE UP (ref 10.3–14.5)
SODIUM SERPL-SCNC: 143 MMOL/L — SIGNIFICANT CHANGE UP (ref 135–145)
SPECIMEN SOURCE: SIGNIFICANT CHANGE UP
WBC # BLD: 5.83 K/UL — SIGNIFICANT CHANGE UP (ref 3.8–10.5)
WBC # FLD AUTO: 5.83 K/UL — SIGNIFICANT CHANGE UP (ref 3.8–10.5)

## 2018-06-17 PROCEDURE — 99233 SBSQ HOSP IP/OBS HIGH 50: CPT

## 2018-06-17 RX ORDER — ATENOLOL 25 MG/1
50 TABLET ORAL DAILY
Qty: 0 | Refills: 0 | Status: DISCONTINUED | OUTPATIENT
Start: 2018-06-17 | End: 2018-06-21

## 2018-06-17 RX ORDER — POTASSIUM CHLORIDE 20 MEQ
40 PACKET (EA) ORAL EVERY 4 HOURS
Qty: 0 | Refills: 0 | Status: COMPLETED | OUTPATIENT
Start: 2018-06-17 | End: 2018-06-17

## 2018-06-17 RX ORDER — WARFARIN SODIUM 2.5 MG/1
1.5 TABLET ORAL ONCE
Qty: 0 | Refills: 0 | Status: COMPLETED | OUTPATIENT
Start: 2018-06-17 | End: 2018-06-17

## 2018-06-17 RX ORDER — WARFARIN SODIUM 2.5 MG/1
2 TABLET ORAL ONCE
Qty: 0 | Refills: 0 | Status: DISCONTINUED | OUTPATIENT
Start: 2018-06-17 | End: 2018-06-17

## 2018-06-17 RX ADMIN — Medication 40 MILLIEQUIVALENT(S): at 14:57

## 2018-06-17 RX ADMIN — PANTOPRAZOLE SODIUM 40 MILLIGRAM(S): 20 TABLET, DELAYED RELEASE ORAL at 05:37

## 2018-06-17 RX ADMIN — Medication 40 MILLIEQUIVALENT(S): at 11:42

## 2018-06-17 RX ADMIN — WARFARIN SODIUM 1.5 MILLIGRAM(S): 2.5 TABLET ORAL at 16:31

## 2018-06-17 RX ADMIN — Medication 112 MICROGRAM(S): at 05:37

## 2018-06-17 RX ADMIN — ATENOLOL 50 MILLIGRAM(S): 25 TABLET ORAL at 14:56

## 2018-06-17 NOTE — PROGRESS NOTE ADULT - SUBJECTIVE AND OBJECTIVE BOX
CHIEF COMPLAINT: Patient is a 88y old  female who presents with a chief complaint of poor po intake, cough and weakness (15 Yoni 2018 23:09)      SUBJECTIVE / OVERNIGHT EVENTS:    No complaints. Reports occasional cough. Denies urinary symptoms. Denies abdominal pain.    MEDICATIONS  (STANDING):  azithromycin  IVPB 500 milliGRAM(s) IV Intermittent every 24 hours  azithromycin  IVPB      cefTRIAXone   IVPB 1 Gram(s) IV Intermittent every 24 hours  cefTRIAXone   IVPB      levothyroxine 112 MICROGram(s) Oral daily  pantoprazole    Tablet 40 milliGRAM(s) Oral before breakfast  potassium chloride    Tablet ER 40 milliEquivalent(s) Oral every 4 hours  sodium chloride 0.9%. 1000 milliLiter(s) (100 mL/Hr) IV Continuous <Continuous>  warfarin 1.5 milliGRAM(s) Oral once    MEDICATIONS  (PRN):  acetaminophen   Tablet 650 milliGRAM(s) Oral every 6 hours PRN For Temp greater than 38 C (100.4 F)      VITALS:  T(F): 97.9 (18 @ 12:19), Max: 98.2 (18 @ 05:34)  HR: 122 (18 @ 12:19) (88 - 122)  BP: 137/82 (18 @ 12:19) (133/75 - 142/92)  RR: 18 (18 @ 12:19) (17 - 18)  SpO2: 96% (18 @ 12:19)      CAPILLARY BLOOD GLUCOSE    Output     I&O's Summary  T(F): 97.9 (18 @ 12:19), Max: 98.2 (18 @ 05:34)  HR: 122 (18 @ 12:19) (88 - 122)  BP: 137/82 (18 @ 12:19) (133/75 - 142/92)  RR: 18 (18 @ 12:19) (17 - 18)  SpO2: 96% (18 @ 12:19)    PHYSICAL EXAM:  GENERAL: NAD, well-developed  HEAD:  Atraumatic, Normocephalic  EYES: EOMI  NECK: Supple, No JVD  CHEST/LUNG: nonlabored breathing  HEART: nl S1/S2  ABDOMEN: nondistended, soft  EXTREMITIES:  no LE edema  PSYCH: alert, answering questions appropriately  NEUROLOGY: non-focal  SKIN: No rashes noted    LABS:              11.0                 143  | 27   | 14           5.83  >-----------< 180     ------------------------< 96                    34.1                 3.3  | 103  | 0.70                                         Ca 8.7   Mg 1.8   Ph 2.5         TPro  7.0  /  Alb  3.2      TBili  0.7  /  DBili  x         AST  21  /  ALT  9             AlkPhos  76      INR: 2.30<H>;    PT: 26.0 SEC<H>;    PTT: x            Urinalysis Basic - ( 15 Yoni 2018 16:45 )    Color: PINK / Appearance: TURBID / S.019 / pH: 5.5  Gluc: NEGATIVE / Ketone: TRACE  / Bili: NEGATIVE / Urobili: 1 mg/dL   Blood: LARGE / Protein: 100 mg/dL / Nitrite: NEGATIVE   Leuk Esterase: NEGATIVE / RBC: >50 / WBC x   Sq Epi: OCC / Non Sq Epi: x / Bacteria: x        VB-15 @ 15:00  7.43/53/< 24/31/24.7/9.9        MICROBIOLOGY:    Culture - Urine (collected 15 Yoni 2018 18:28)  Source: URINE CATHETER  Final Report (2018 08:09):    NO GROWTH AT 24 HOURS    Culture - Blood (collected 15 Yoni 2018 15:33)  Source: BLOOD VENOUS  Preliminary Report (2018 15:33):    NO ORGANISMS ISOLATED    NO ORGANISMS ISOLATED AT 24 HOURS    Culture - Blood (collected 15 Yoni 2018 15:33)  Source: BLOOD PERIPHERAL  Preliminary Report (2018 15:33):    NO ORGANISMS ISOLATED    NO ORGANISMS ISOLATED AT 24 HOURS          RADIOLOGY & ADDITIONAL TESTS:    Imaging Personally Reviewed:        [] Consultant(s) Notes Reviewed:    [] Care Discussed with Consultants/Other Providers:

## 2018-06-17 NOTE — PROGRESS NOTE ADULT - PROBLEM SELECTOR PLAN 2
- May be secondary to PNA. Per ED attending, R lower consolidation noted on bedside US and pt presented with cough. RVP negative  - Will treat for PNA - change abx to Levaquin 500 mg daily  - f/u blood cultures

## 2018-06-17 NOTE — PROGRESS NOTE ADULT - PROBLEM SELECTOR PLAN 7
- Continue coumadin  - HR elevated today - home atenolol had been held  - resume atenolol today  - monitor HR

## 2018-06-18 ENCOUNTER — APPOINTMENT (OUTPATIENT)
Dept: HOME HEALTH SERVICES | Facility: HOME HEALTH | Age: 83
End: 2018-06-18

## 2018-06-18 DIAGNOSIS — F03.90 UNSPECIFIED DEMENTIA WITHOUT BEHAVIORAL DISTURBANCE: ICD-10-CM

## 2018-06-18 DIAGNOSIS — F03.91 UNSPECIFIED DEMENTIA WITH BEHAVIORAL DISTURBANCE: ICD-10-CM

## 2018-06-18 LAB
BUN SERPL-MCNC: 16 MG/DL — SIGNIFICANT CHANGE UP (ref 7–23)
CALCIUM SERPL-MCNC: 8.8 MG/DL — SIGNIFICANT CHANGE UP (ref 8.4–10.5)
CHLORIDE SERPL-SCNC: 104 MMOL/L — SIGNIFICANT CHANGE UP (ref 98–107)
CO2 SERPL-SCNC: 27 MMOL/L — SIGNIFICANT CHANGE UP (ref 22–31)
CREAT SERPL-MCNC: 0.68 MG/DL — SIGNIFICANT CHANGE UP (ref 0.5–1.3)
GLUCOSE SERPL-MCNC: 107 MG/DL — HIGH (ref 70–99)
HCT VFR BLD CALC: 34.3 % — LOW (ref 34.5–45)
HGB BLD-MCNC: 11.1 G/DL — LOW (ref 11.5–15.5)
INR BLD: 2.74 — HIGH (ref 0.88–1.17)
MAGNESIUM SERPL-MCNC: 1.8 MG/DL — SIGNIFICANT CHANGE UP (ref 1.6–2.6)
MCHC RBC-ENTMCNC: 29.1 PG — SIGNIFICANT CHANGE UP (ref 27–34)
MCHC RBC-ENTMCNC: 32.4 % — SIGNIFICANT CHANGE UP (ref 32–36)
MCV RBC AUTO: 90 FL — SIGNIFICANT CHANGE UP (ref 80–100)
NRBC # FLD: 0 — SIGNIFICANT CHANGE UP
PHOSPHATE SERPL-MCNC: 2.5 MG/DL — SIGNIFICANT CHANGE UP (ref 2.5–4.5)
PLATELET # BLD AUTO: 192 K/UL — SIGNIFICANT CHANGE UP (ref 150–400)
PMV BLD: 10.1 FL — SIGNIFICANT CHANGE UP (ref 7–13)
POTASSIUM SERPL-MCNC: 3.9 MMOL/L — SIGNIFICANT CHANGE UP (ref 3.5–5.3)
POTASSIUM SERPL-SCNC: 3.9 MMOL/L — SIGNIFICANT CHANGE UP (ref 3.5–5.3)
PROTHROM AB SERPL-ACNC: 32.2 SEC — HIGH (ref 9.8–13.1)
RBC # BLD: 3.81 M/UL — SIGNIFICANT CHANGE UP (ref 3.8–5.2)
RBC # FLD: 14.1 % — SIGNIFICANT CHANGE UP (ref 10.3–14.5)
SODIUM SERPL-SCNC: 143 MMOL/L — SIGNIFICANT CHANGE UP (ref 135–145)
WBC # BLD: 6.71 K/UL — SIGNIFICANT CHANGE UP (ref 3.8–10.5)
WBC # FLD AUTO: 6.71 K/UL — SIGNIFICANT CHANGE UP (ref 3.8–10.5)

## 2018-06-18 PROCEDURE — 99233 SBSQ HOSP IP/OBS HIGH 50: CPT

## 2018-06-18 PROCEDURE — 70450 CT HEAD/BRAIN W/O DYE: CPT | Mod: 26

## 2018-06-18 PROCEDURE — 90792 PSYCH DIAG EVAL W/MED SRVCS: CPT

## 2018-06-18 RX ORDER — WARFARIN SODIUM 2.5 MG/1
1.5 TABLET ORAL ONCE
Qty: 0 | Refills: 0 | Status: COMPLETED | OUTPATIENT
Start: 2018-06-18 | End: 2018-06-18

## 2018-06-18 RX ADMIN — ATENOLOL 50 MILLIGRAM(S): 25 TABLET ORAL at 05:18

## 2018-06-18 RX ADMIN — Medication 112 MICROGRAM(S): at 05:18

## 2018-06-18 RX ADMIN — PANTOPRAZOLE SODIUM 40 MILLIGRAM(S): 20 TABLET, DELAYED RELEASE ORAL at 05:18

## 2018-06-18 RX ADMIN — WARFARIN SODIUM 1.5 MILLIGRAM(S): 2.5 TABLET ORAL at 19:11

## 2018-06-18 NOTE — BEHAVIORAL HEALTH ASSESSMENT NOTE - NSBHCHARTREVIEWVS_PSY_A_CORE FT
Vital Signs Last 24 Hrs  T(C): 36.7 (18 Jun 2018 14:11), Max: 37.4 (17 Jun 2018 22:52)  T(F): 98 (18 Jun 2018 14:11), Max: 99.3 (17 Jun 2018 22:52)  HR: 78 (18 Jun 2018 14:11) (70 - 79)  BP: 147/67 (18 Jun 2018 14:11) (126/84 - 147/77)  BP(mean): --  RR: 18 (18 Jun 2018 14:11) (17 - 18)  SpO2: 97% (18 Jun 2018 14:11) (94% - 97%)

## 2018-06-18 NOTE — PROGRESS NOTE ADULT - PROBLEM SELECTOR PLAN 3
- Pt states she tripped on rug, no LOC or presyncopal symptoms  - Fall precautions  - PT consult in am - Pt states she tripped on rug, no LOC or presyncopal symptoms  - Fall precautions  - PT consult  will get head ct

## 2018-06-18 NOTE — PROGRESS NOTE ADULT - PROBLEM SELECTOR PLAN 2
- May be secondary to PNA. Per ED attending, R lower consolidation noted on bedside US and pt presented with cough. RVP negative  - Will treat for PNA - change abx to Levaquin 500 mg daily  - f/u blood cultures - May be secondary to PNA. Per ED attending, R lower consolidation noted on bedside US and pt presented with cough. RVP negative  - Will treat for PNA - cont Levaquin   - f/u blood cultures

## 2018-06-18 NOTE — BEHAVIORAL HEALTH ASSESSMENT NOTE - SUMMARY
87 yo F dementia, A fib on coumadin, hypothyroid, lung cancer s/p chemo/RT in remission, GERD sent in by home aid for poor PO intake and cough. Pt unable to provide history, AAOx 2 to person and place but does not know why she is in the hospital. Unable to contact family at this time. Pt says she has no complaints. She does endorse falling at home 2 days ago- says she tripped on carpet , landed on her knees. Pt says that she does not want any more procedures.  She complains of SOB and says she is too uncomfortable to lie down because she has difficulty breathing.  She is not able to state the reason for any of the medical tests.  She has no acute psychiatric symptoms and no thoughts of harming herself or others. Pt lacks capacity to fully participate in her medical care and discharge planning since she is unable to fully discuss the risks and benefits.  She may benefit from a palliative team consult and goals of care meeting since her daughter is requesting that she have all tests that are indicated.   PT may benefit from Seroquel 12.5mg po q6hrs prn for agitation only

## 2018-06-18 NOTE — BEHAVIORAL HEALTH ASSESSMENT NOTE - NSBHCHARTREVIEWLAB_PSY_A_CORE FT
11.1   6.71  )-----------( 192      ( 18 Jun 2018 06:20 )             34.3   06-18    143  |  104  |  16  ----------------------------<  107<H>  3.9   |  27  |  0.68    Ca    8.8      18 Jun 2018 06:20  Phos  2.5     06-18  Mg     1.8     06-18

## 2018-06-18 NOTE — PROGRESS NOTE ADULT - SUBJECTIVE AND OBJECTIVE BOX
Patient is a 88y old  Female who presents with a chief complaint of poor po intake, cough and weakness (15 Yoni 2018 23:09)      SUBJECTIVE / OVERNIGHT EVENTS: Pt was seen and examined at 1:05pm, no overnight events, per nsg pt refused doppler and renal sono, now wants her to be left alone, states that she is sick and want to be left alone.     MEDICATIONS  (STANDING):  ATENolol  Tablet 50 milliGRAM(s) Oral daily  levoFLOXacin  Tablet 250 milliGRAM(s) Oral every 24 hours  levothyroxine 112 MICROGram(s) Oral daily  pantoprazole    Tablet 40 milliGRAM(s) Oral before breakfast  sodium chloride 0.9%. 1000 milliLiter(s) (100 mL/Hr) IV Continuous <Continuous>    MEDICATIONS  (PRN):  acetaminophen   Tablet 650 milliGRAM(s) Oral every 6 hours PRN For Temp greater than 38 C (100.4 F)      Vital Signs Last 24 Hrs  T(C): 36.7 (18 Jun 2018 14:11), Max: 37.4 (17 Jun 2018 22:52)  T(F): 98 (18 Jun 2018 14:11), Max: 99.3 (17 Jun 2018 22:52)  HR: 78 (18 Jun 2018 14:11) (68 - 79)  BP: 147/67 (18 Jun 2018 14:11) (126/84 - 147/77)  BP(mean): --  RR: 18 (18 Jun 2018 14:11) (17 - 18)  SpO2: 97% (18 Jun 2018 14:11) (94% - 97%)  CAPILLARY BLOOD GLUCOSE        I&O's Summary      PHYSICAL EXAM:  GENERAL: NAD, well-developed  CHEST/LUNG: Clear to auscultation bilaterally however limited exam  HEART: Regular rate and rhythm  ABDOMEN: Soft, Nontender, Nondistended  EXTREMITIES:   edema  PSYCH: AAOx3  NEUROLOGY: non-focal  SKIN: No rashes or lesions    LABS:                        11.1   6.71  )-----------( 192      ( 18 Jun 2018 06:20 )             34.3     06-18    143  |  104  |  16  ----------------------------<  107<H>  3.9   |  27  |  0.68    Ca    8.8      18 Jun 2018 06:20  Phos  2.5     06-18  Mg     1.8     06-18      PT/INR - ( 18 Jun 2018 06:20 )   PT: 32.2 SEC;   INR: 2.74                    RADIOLOGY & ADDITIONAL TESTS:    Imaging Personally Reviewed:    Consultant(s) Notes Reviewed:      Care Discussed with Consultants/Other Providers: Patient is a 88y old  Female who presents with a chief complaint of poor po intake, cough and weakness (15 Yoni 2018 23:09)      SUBJECTIVE / OVERNIGHT EVENTS: Pt was seen and examined at 1:05pm, no overnight events, per nsg pt refused doppler and renal sono, now wants her to be left alone, states that she is sick and want to be left alone.     MEDICATIONS  (STANDING):  ATENolol  Tablet 50 milliGRAM(s) Oral daily  levoFLOXacin  Tablet 250 milliGRAM(s) Oral every 24 hours  levothyroxine 112 MICROGram(s) Oral daily  pantoprazole    Tablet 40 milliGRAM(s) Oral before breakfast  sodium chloride 0.9%. 1000 milliLiter(s) (100 mL/Hr) IV Continuous <Continuous>    MEDICATIONS  (PRN):  acetaminophen   Tablet 650 milliGRAM(s) Oral every 6 hours PRN For Temp greater than 38 C (100.4 F)      Vital Signs Last 24 Hrs  T(C): 36.7 (18 Jun 2018 14:11), Max: 37.4 (17 Jun 2018 22:52)  T(F): 98 (18 Jun 2018 14:11), Max: 99.3 (17 Jun 2018 22:52)  HR: 78 (18 Jun 2018 14:11) (68 - 79)  BP: 147/67 (18 Jun 2018 14:11) (126/84 - 147/77)  BP(mean): --  RR: 18 (18 Jun 2018 14:11) (17 - 18)  SpO2: 97% (18 Jun 2018 14:11) (94% - 97%)  CAPILLARY BLOOD GLUCOSE        I&O's Summary      PHYSICAL EXAM:  GENERAL: NAD, well-developed  CHEST/LUNG: Clear to auscultation bilaterally however limited exam  HEART: Regular rate and rhythm  ABDOMEN: Soft, Nontender, Nondistended  EXTREMITIES:  RLE +edema  PSYCH: AAOx3  NEUROLOGY: non-focal  SKIN: No rashes or lesions    LABS:                        11.1   6.71  )-----------( 192      ( 18 Jun 2018 06:20 )             34.3     06-18    143  |  104  |  16  ----------------------------<  107<H>  3.9   |  27  |  0.68    Ca    8.8      18 Jun 2018 06:20  Phos  2.5     06-18  Mg     1.8     06-18      PT/INR - ( 18 Jun 2018 06:20 )   PT: 32.2 SEC;   INR: 2.74                    RADIOLOGY & ADDITIONAL TESTS:    Imaging Personally Reviewed:    Consultant(s) Notes Reviewed:      Care Discussed with Consultants/Other Providers: Patient is a 88y old  Female who presents with a chief complaint of poor po intake, cough and weakness (15 Yoni 2018 23:09)      SUBJECTIVE / OVERNIGHT EVENTS: Pt was seen and examined at 1:05pm, no overnight events, per nsg pt refused doppler and renal sono, now wants her to be left alone, states that she is sick and want to be left alone.     MEDICATIONS  (STANDING):  ATENolol  Tablet 50 milliGRAM(s) Oral daily  levoFLOXacin  Tablet 250 milliGRAM(s) Oral every 24 hours  levothyroxine 112 MICROGram(s) Oral daily  pantoprazole    Tablet 40 milliGRAM(s) Oral before breakfast  sodium chloride 0.9%. 1000 milliLiter(s) (100 mL/Hr) IV Continuous <Continuous>    MEDICATIONS  (PRN):  acetaminophen   Tablet 650 milliGRAM(s) Oral every 6 hours PRN For Temp greater than 38 C (100.4 F)      Vital Signs Last 24 Hrs  T(C): 36.7 (18 Jun 2018 14:11), Max: 37.4 (17 Jun 2018 22:52)  T(F): 98 (18 Jun 2018 14:11), Max: 99.3 (17 Jun 2018 22:52)  HR: 78 (18 Jun 2018 14:11) (68 - 79)  BP: 147/67 (18 Jun 2018 14:11) (126/84 - 147/77)  BP(mean): --  RR: 18 (18 Jun 2018 14:11) (17 - 18)  SpO2: 97% (18 Jun 2018 14:11) (94% - 97%)  CAPILLARY BLOOD GLUCOSE        I&O's Summary      PHYSICAL EXAM:  GENERAL: NAD, well-developed  CHEST/LUNG: Clear to auscultation bilaterally however limited exam  HEART: Regular rate and rhythm  ABDOMEN: Soft, Nontender, Nondistended  EXTREMITIES:  RLE +edema  PSYCH: wants to be left alone, irritated  NEUROLOGY: Irritable, alert, wants to be left alone.  SKIN: No rashes or lesions    LABS:                        11.1   6.71  )-----------( 192      ( 18 Jun 2018 06:20 )             34.3     06-18    143  |  104  |  16  ----------------------------<  107<H>  3.9   |  27  |  0.68    Ca    8.8      18 Jun 2018 06:20  Phos  2.5     06-18  Mg     1.8     06-18      PT/INR - ( 18 Jun 2018 06:20 )   PT: 32.2 SEC;   INR: 2.74                    RADIOLOGY & ADDITIONAL TESTS:    Imaging Personally Reviewed:    Consultant(s) Notes Reviewed:      Care Discussed with Consultants/Other Providers:

## 2018-06-18 NOTE — BEHAVIORAL HEALTH ASSESSMENT NOTE - HPI (INCLUDE ILLNESS QUALITY, SEVERITY, DURATION, TIMING, CONTEXT, MODIFYING FACTORS, ASSOCIATED SIGNS AND SYMPTOMS)
87 yo F dementia, A fib on coumadin, hypothyroid, lung cancer s/p chemo/RT in remission, GERD sent in by home aid for poor PO intake and cough. Pt unable to provide history, AAOx 2 to person and place but does not know why she is in the hospital. Unable to contact family at this time. Pt says she has no complaints. She does endorse falling at home 2 days ago- says she tripped on carpet , landed on her knees. Pt says that she does not want any more procedures.  She complains of SOB and says she is too uncomfortable to lie down because she has difficulty breathing.  She is not able to state the reason for any of the medical tests.  She has no acute psychiatric symptoms and no thoughts of harming herself or others.

## 2018-06-18 NOTE — PROGRESS NOTE ADULT - PROBLEM SELECTOR PLAN 4
- Check duplex - Check duplex-pt refused today, will atttempt tomorrow will get psych consult per daughter pt was on seroquel before, will f/u with psych with behavioural disturbance will get psych consult

## 2018-06-18 NOTE — PROGRESS NOTE ADULT - PROBLEM SELECTOR PLAN 7
- Continue coumadin  - HR elevated today - home atenolol had been held  - resume atenolol today  - monitor HR - Continue coumadin   - cont atenolol   - monitor HR - Continue synthroid

## 2018-06-18 NOTE — PROGRESS NOTE ADULT - PROBLEM SELECTOR PLAN 1
- In setting of poor PO intake and sepsis  - Gentle IVFs. Continue IVFs  - Nutrition consult in am - In setting of poor PO intake and sepsis  - Gentle IVFs. Continue IVFs  - f/u Nutrition consult

## 2018-06-18 NOTE — PROGRESS NOTE ADULT - PROBLEM SELECTOR PLAN 5
- Check renal/bladder US  - urology consult vs out pt follow up - refused renal/bladder US  - urology consult vs out pt follow up - Check duplex-pt refused today, will atttempt tomorrow

## 2018-06-18 NOTE — BEHAVIORAL HEALTH ASSESSMENT NOTE - NSBHCHARTREVIEWINVESTIGATE_PSY_A_CORE FT
Ventricular Rate 110 BPM    Atrial Rate 101 BPM    QRS Duration 86 ms    Q-T Interval 352 ms    QTC Calculation(Bezet) 476 ms    R Axis 20 degrees    T Axis -14 degrees    Diagnosis Line Atrial fibrillation with rapid ventricular response  AbnormalECG

## 2018-06-19 LAB
BUN SERPL-MCNC: 18 MG/DL — SIGNIFICANT CHANGE UP (ref 7–23)
CALCIUM SERPL-MCNC: 9.3 MG/DL — SIGNIFICANT CHANGE UP (ref 8.4–10.5)
CHLORIDE SERPL-SCNC: 103 MMOL/L — SIGNIFICANT CHANGE UP (ref 98–107)
CO2 SERPL-SCNC: 29 MMOL/L — SIGNIFICANT CHANGE UP (ref 22–31)
CREAT SERPL-MCNC: 0.77 MG/DL — SIGNIFICANT CHANGE UP (ref 0.5–1.3)
GLUCOSE SERPL-MCNC: 99 MG/DL — SIGNIFICANT CHANGE UP (ref 70–99)
HCT VFR BLD CALC: 37.5 % — SIGNIFICANT CHANGE UP (ref 34.5–45)
HGB BLD-MCNC: 11.6 G/DL — SIGNIFICANT CHANGE UP (ref 11.5–15.5)
INR BLD: 2.98 — HIGH (ref 0.88–1.17)
MAGNESIUM SERPL-MCNC: 1.9 MG/DL — SIGNIFICANT CHANGE UP (ref 1.6–2.6)
MCHC RBC-ENTMCNC: 29.1 PG — SIGNIFICANT CHANGE UP (ref 27–34)
MCHC RBC-ENTMCNC: 30.9 % — LOW (ref 32–36)
MCV RBC AUTO: 94 FL — SIGNIFICANT CHANGE UP (ref 80–100)
NRBC # FLD: 0 — SIGNIFICANT CHANGE UP
PHOSPHATE SERPL-MCNC: 2.8 MG/DL — SIGNIFICANT CHANGE UP (ref 2.5–4.5)
PLATELET # BLD AUTO: 230 K/UL — SIGNIFICANT CHANGE UP (ref 150–400)
PMV BLD: 10.3 FL — SIGNIFICANT CHANGE UP (ref 7–13)
POTASSIUM SERPL-MCNC: 3.7 MMOL/L — SIGNIFICANT CHANGE UP (ref 3.5–5.3)
POTASSIUM SERPL-SCNC: 3.7 MMOL/L — SIGNIFICANT CHANGE UP (ref 3.5–5.3)
PROTHROM AB SERPL-ACNC: 33.8 SEC — HIGH (ref 9.8–13.1)
RBC # BLD: 3.99 M/UL — SIGNIFICANT CHANGE UP (ref 3.8–5.2)
RBC # FLD: 14.2 % — SIGNIFICANT CHANGE UP (ref 10.3–14.5)
SODIUM SERPL-SCNC: 143 MMOL/L — SIGNIFICANT CHANGE UP (ref 135–145)
WBC # BLD: 5.6 K/UL — SIGNIFICANT CHANGE UP (ref 3.8–10.5)
WBC # FLD AUTO: 5.6 K/UL — SIGNIFICANT CHANGE UP (ref 3.8–10.5)

## 2018-06-19 PROCEDURE — 99233 SBSQ HOSP IP/OBS HIGH 50: CPT

## 2018-06-19 RX ORDER — QUETIAPINE FUMARATE 200 MG/1
12.5 TABLET, FILM COATED ORAL EVERY 6 HOURS
Qty: 0 | Refills: 0 | Status: DISCONTINUED | OUTPATIENT
Start: 2018-06-19 | End: 2018-06-21

## 2018-06-19 RX ORDER — WARFARIN SODIUM 2.5 MG/1
1 TABLET ORAL ONCE
Qty: 0 | Refills: 0 | Status: COMPLETED | OUTPATIENT
Start: 2018-06-19 | End: 2018-06-19

## 2018-06-19 RX ADMIN — PANTOPRAZOLE SODIUM 40 MILLIGRAM(S): 20 TABLET, DELAYED RELEASE ORAL at 06:47

## 2018-06-19 RX ADMIN — WARFARIN SODIUM 1 MILLIGRAM(S): 2.5 TABLET ORAL at 15:31

## 2018-06-19 RX ADMIN — Medication 112 MICROGRAM(S): at 06:46

## 2018-06-19 RX ADMIN — ATENOLOL 50 MILLIGRAM(S): 25 TABLET ORAL at 06:46

## 2018-06-19 NOTE — PROGRESS NOTE ADULT - PROBLEM SELECTOR PLAN 2
- May be secondary to PNA. Per ED attending, R lower consolidation noted on bedside US and pt presented with cough. RVP negative  - Will treat for PNA - cont Levaquin   - f/u blood cultures, neg to date

## 2018-06-19 NOTE — PROGRESS NOTE ADULT - PROBLEM SELECTOR PLAN 4
with behavioural disturbance appreciate psych consult, per psych placed on prn however per Dr. Maldonado will need standing meds, f/u her note  f/u b12, folate and rpr

## 2018-06-19 NOTE — PROGRESS NOTE ADULT - SUBJECTIVE AND OBJECTIVE BOX
Patient is a 88y old  Female who presents with a chief complaint of poor po intake, cough and weakness (15 Yoni 2018 23:09)      SUBJECTIVE / OVERNIGHT EVENTS: Pt was seen and examined at 12:55pm, no overnight events, feels ok today, wants to go home, sitting and trying to eat lunch has not much of an appetite she reports, otherwise no new issues       MEDICATIONS  (STANDING):  ATENolol  Tablet 50 milliGRAM(s) Oral daily  levoFLOXacin  Tablet 250 milliGRAM(s) Oral every 24 hours  levothyroxine 112 MICROGram(s) Oral daily  pantoprazole    Tablet 40 milliGRAM(s) Oral before breakfast  sodium chloride 0.9%. 1000 milliLiter(s) (100 mL/Hr) IV Continuous <Continuous>  warfarin 1 milliGRAM(s) Oral once    MEDICATIONS  (PRN):  acetaminophen   Tablet 650 milliGRAM(s) Oral every 6 hours PRN For Temp greater than 38 C (100.4 F)  QUEtiapine 12.5 milliGRAM(s) Oral every 6 hours PRN Agitation      Vital Signs Last 24 Hrs  T(C): 36.9 (19 Jun 2018 14:02), Max: 36.9 (19 Jun 2018 14:02)  T(F): 98.5 (19 Jun 2018 14:02), Max: 98.5 (19 Jun 2018 14:02)  HR: 85 (19 Jun 2018 14:02) (83 - 87)  BP: 90/60 (19 Jun 2018 14:02) (90/60 - 139/63)  BP(mean): --  RR: 16 (19 Jun 2018 14:02) (16 - 17)  SpO2: 98% (19 Jun 2018 14:02) (94% - 99%)  CAPILLARY BLOOD GLUCOSE        I&O's Summary      PHYSICAL EXAM:  ates that she is sick and want to be left alone.     MEDICATIONS  (STANDING):  ATENolol  Tablet 50 milliGRAM(s) Oral daily  levoFLOXacin  Tablet 250 milliGRAM(s) Oral every 24 hours  levothyroxine 112 MICROGram(s) Oral daily  pantoprazole    Tablet 40 milliGRAM(s) Oral before breakfast  sodium chloride 0.9%. 1000 milliLiter(s) (100 mL/Hr) IV Continuous <Continuous>    MEDICATIONS  (PRN):  acetaminophen   Tablet 650 milliGRAM(s) Oral every 6 hours PRN For Temp greater than 38 C (100.4 F)      Vital Signs Last 24 Hrs  T(C): 36.7 (18 Jun 2018 14:11), Max: 37.4 (17 Jun 2018 22:52)  T(F): 98 (18 Jun 2018 14:11), Max: 99.3 (17 Jun 2018 22:52)  HR: 78 (18 Jun 2018 14:11) (68 - 79)  BP: 147/67 (18 Jun 2018 14:11) (126/84 - 147/77)  BP(mean): --  RR: 18 (18 Jun 2018 14:11) (17 - 18)  SpO2: 97% (18 Jun 2018 14:11) (94% - 97%)  CAPILLARY BLOOD GLUCOSE        I&O's Summary      PHYSICAL EXAM:  GENERAL: NAD, well-developed  CHEST/LUNG: Clear to auscultation bilaterally   HEART: Regular rate and rhythm  ABDOMEN: Soft, Nontender, Nondistended  EXTREMITIES:  no edema  PSYCH: calm  NEUROLOGY: alert, awake responsive.  SKIN: No rashes or lesions      LABS:                        11.6   5.60  )-----------( 230      ( 19 Jun 2018 06:50 )             37.5     06-19    143  |  103  |  18  ----------------------------<  99  3.7   |  29  |  0.77    Ca    9.3      19 Jun 2018 06:50  Phos  2.8     06-19  Mg     1.9     06-19      PT/INR - ( 19 Jun 2018 06:50 )   PT: 33.8 SEC;   INR: 2.98                    RADIOLOGY & ADDITIONAL TESTS:    Imaging Personally Reviewed:    Consultant(s) Notes Reviewed:      Care Discussed with Consultants/Other Providers:

## 2018-06-19 NOTE — PROGRESS NOTE ADULT - PROBLEM SELECTOR PLAN 3
- Pt states she tripped on rug, no LOC or presyncopal symptoms  - Fall precautions  - PT consult rec rehab but pt and family refusing rehab  head ct no new changes  left message with family for call back, likely d/c tomorrow

## 2018-06-20 LAB
BACTERIA BLD CULT: SIGNIFICANT CHANGE UP
BACTERIA BLD CULT: SIGNIFICANT CHANGE UP
BUN SERPL-MCNC: 18 MG/DL — SIGNIFICANT CHANGE UP (ref 7–23)
CALCIUM SERPL-MCNC: 9 MG/DL — SIGNIFICANT CHANGE UP (ref 8.4–10.5)
CHLORIDE SERPL-SCNC: 107 MMOL/L — SIGNIFICANT CHANGE UP (ref 98–107)
CO2 SERPL-SCNC: 30 MMOL/L — SIGNIFICANT CHANGE UP (ref 22–31)
CREAT SERPL-MCNC: 0.83 MG/DL — SIGNIFICANT CHANGE UP (ref 0.5–1.3)
FOLATE SERPL-MCNC: 14.3 NG/ML — SIGNIFICANT CHANGE UP (ref 4.7–20)
GLUCOSE SERPL-MCNC: 95 MG/DL — SIGNIFICANT CHANGE UP (ref 70–99)
HCT VFR BLD CALC: 33.5 % — LOW (ref 34.5–45)
HGB BLD-MCNC: 10.8 G/DL — LOW (ref 11.5–15.5)
INR BLD: 3.19 — HIGH (ref 0.88–1.17)
MAGNESIUM SERPL-MCNC: 1.9 MG/DL — SIGNIFICANT CHANGE UP (ref 1.6–2.6)
MCHC RBC-ENTMCNC: 29.2 PG — SIGNIFICANT CHANGE UP (ref 27–34)
MCHC RBC-ENTMCNC: 32.2 % — SIGNIFICANT CHANGE UP (ref 32–36)
MCV RBC AUTO: 90.5 FL — SIGNIFICANT CHANGE UP (ref 80–100)
NRBC # FLD: 0 — SIGNIFICANT CHANGE UP
PHOSPHATE SERPL-MCNC: 3.2 MG/DL — SIGNIFICANT CHANGE UP (ref 2.5–4.5)
PLATELET # BLD AUTO: 203 K/UL — SIGNIFICANT CHANGE UP (ref 150–400)
PMV BLD: 10.4 FL — SIGNIFICANT CHANGE UP (ref 7–13)
POTASSIUM SERPL-MCNC: 3.9 MMOL/L — SIGNIFICANT CHANGE UP (ref 3.5–5.3)
POTASSIUM SERPL-SCNC: 3.9 MMOL/L — SIGNIFICANT CHANGE UP (ref 3.5–5.3)
PROTHROM AB SERPL-ACNC: 37.6 SEC — HIGH (ref 9.8–13.1)
RBC # BLD: 3.7 M/UL — LOW (ref 3.8–5.2)
RBC # FLD: 14.4 % — SIGNIFICANT CHANGE UP (ref 10.3–14.5)
SODIUM SERPL-SCNC: 146 MMOL/L — HIGH (ref 135–145)
T PALLIDUM AB TITR SER: NEGATIVE — SIGNIFICANT CHANGE UP
TSH SERPL-MCNC: 1.9 UIU/ML — SIGNIFICANT CHANGE UP (ref 0.27–4.2)
VIT B12 SERPL-MCNC: 834 PG/ML — SIGNIFICANT CHANGE UP (ref 200–900)
WBC # BLD: 4.09 K/UL — SIGNIFICANT CHANGE UP (ref 3.8–10.5)
WBC # FLD AUTO: 4.09 K/UL — SIGNIFICANT CHANGE UP (ref 3.8–10.5)

## 2018-06-20 PROCEDURE — 99233 SBSQ HOSP IP/OBS HIGH 50: CPT

## 2018-06-20 RX ORDER — QUETIAPINE FUMARATE 200 MG/1
12.5 TABLET, FILM COATED ORAL ONCE
Qty: 0 | Refills: 0 | Status: DISCONTINUED | OUTPATIENT
Start: 2018-06-20 | End: 2018-06-20

## 2018-06-20 RX ORDER — QUETIAPINE FUMARATE 200 MG/1
12.5 TABLET, FILM COATED ORAL
Qty: 0 | Refills: 0 | Status: DISCONTINUED | OUTPATIENT
Start: 2018-06-20 | End: 2018-06-21

## 2018-06-20 RX ORDER — QUETIAPINE FUMARATE 200 MG/1
12.5 TABLET, FILM COATED ORAL
Qty: 0 | Refills: 0 | Status: DISCONTINUED | OUTPATIENT
Start: 2018-06-20 | End: 2018-06-20

## 2018-06-20 RX ADMIN — QUETIAPINE FUMARATE 12.5 MILLIGRAM(S): 200 TABLET, FILM COATED ORAL at 18:14

## 2018-06-20 RX ADMIN — ATENOLOL 50 MILLIGRAM(S): 25 TABLET ORAL at 05:32

## 2018-06-20 RX ADMIN — Medication 112 MICROGRAM(S): at 05:32

## 2018-06-20 RX ADMIN — PANTOPRAZOLE SODIUM 40 MILLIGRAM(S): 20 TABLET, DELAYED RELEASE ORAL at 05:32

## 2018-06-20 NOTE — CHART NOTE - NSCHARTNOTEFT_GEN_A_CORE
d/w Dr. Maldonado ok to start pt on standing home regimen seroquel 12.5mg p.o. daily .  Will try seroquel 12.5mg p.o. x 1 now.

## 2018-06-20 NOTE — PROGRESS NOTE ADULT - ASSESSMENT
87 yo F dementia, A fib on coumadin, hypothyroid, lung cancer s/p chemo/RT in remission, GERD sent in by home aid for poor PO intake, weakness and cough.
89 yo F dementia, A fib on coumadin, hypothyroid, lung cancer s/p chemo/RT in remission, GERD sent in by home aid for poor PO intake, weakness and cough.
87 yo F dementia, A fib on coumadin, hypothyroid, lung cancer s/p chemo/RT in remission, GERD sent in by home aid for poor PO intake, weakness and cough.
89 yo F dementia, A fib on coumadin, hypothyroid, lung cancer s/p chemo/RT in remission, GERD sent in by home aid for poor PO intake, weakness and cough.
87 yo F dementia, A fib on coumadin, hypothyroid, lung cancer s/p chemo/RT in remission, GERD sent in by home aid for poor PO intake, weakness and cough.

## 2018-06-20 NOTE — PROGRESS NOTE ADULT - PROBLEM SELECTOR PROBLEM 3
Fall, initial encounter

## 2018-06-20 NOTE — DIETITIAN INITIAL EVALUATION ADULT. - NS AS NUTRI INTERV MEALS SNACK
Diets modified for specific foods and ingredients/1. Suggest: PO diet rx: Regular, PO supplement: Ensure Enlive 8oz. 2x daily (will provide additional ~700 Kcal, ~40 gm Protein);             2. Encourage & assist Pt with meals; Monitor PO diet tolerance;           3. Monitor labs, weights, hydration status;/Other (specify)

## 2018-06-20 NOTE — PROGRESS NOTE ADULT - SUBJECTIVE AND OBJECTIVE BOX
Patient is a 88y old  Female who presents with a chief complaint of poor po intake, cough and weakness (15 Yoni 2018 23:09)      SUBJECTIVE / OVERNIGHT EVENTS: Pt was seen and examined at 12:05pm, no overnight events, feels ok today, wants to go home, sitting and trying to eat lunch has not much of an appetite she reports, otherwise no new issues       MEDICATIONS  (STANDING):  ATENolol  Tablet 50 milliGRAM(s) Oral daily  levoFLOXacin  Tablet 250 milliGRAM(s) Oral every 24 hours  levothyroxine 112 MICROGram(s) Oral daily  pantoprazole    Tablet 40 milliGRAM(s) Oral before breakfast  QUEtiapine 12.5 milliGRAM(s) Oral <User Schedule>  sodium chloride 0.9%. 1000 milliLiter(s) (100 mL/Hr) IV Continuous <Continuous>    MEDICATIONS  (PRN):  acetaminophen   Tablet 650 milliGRAM(s) Oral every 6 hours PRN For Temp greater than 38 C (100.4 F)  QUEtiapine 12.5 milliGRAM(s) Oral every 6 hours PRN Agitation      Vital Signs Last 24 Hrs  T(C): 36.8 (20 Jun 2018 04:59), Max: 36.8 (20 Jun 2018 04:59)  T(F): 98.2 (20 Jun 2018 04:59), Max: 98.2 (20 Jun 2018 04:59)  HR: 69 (20 Jun 2018 04:59) (62 - 72)  BP: 140/75 (20 Jun 2018 04:59) (116/69 - 140/75)  BP(mean): --  RR: 16 (20 Jun 2018 04:59) (16 - 17)  SpO2: 99% (20 Jun 2018 04:59) (98% - 99%)  CAPILLARY BLOOD GLUCOSE        I&O's Summary      PHYSICAL EXAM:  GENERAL: NAD, well-developed  CHEST/LUNG: Clear to auscultation bilaterally   HEART: Regular rate and rhythm  ABDOMEN: Soft, Nontender, Nondistended  EXTREMITIES:  no edema  PSYCH: calm  NEUROLOGY: alert, awake responsive.  SKIN: No rashes or lesions        LABS:                        10.8   4.09  )-----------( 203      ( 20 Jun 2018 06:40 )             33.5     06-20    146<H>  |  107  |  18  ----------------------------<  95  3.9   |  30  |  0.83    Ca    9.0      20 Jun 2018 07:00  Phos  3.2     06-20  Mg     1.9     06-20      PT/INR - ( 20 Jun 2018 07:00 )   PT: 37.6 SEC;   INR: 3.19                    RADIOLOGY & ADDITIONAL TESTS:    Imaging Personally Reviewed:    Consultant(s) Notes Reviewed:      Care Discussed with Consultants/Other Providers:

## 2018-06-20 NOTE — PROGRESS NOTE ADULT - PROBLEM SELECTOR PLAN 3
- Pt states she tripped on rug, no LOC or presyncopal symptoms  - Fall precautions  - PT consult rec rehab but pt and family refusing rehab  head ct no new changes

## 2018-06-20 NOTE — DIETITIAN INITIAL EVALUATION ADULT. - PHYSICAL APPEARANCE
underweight/Pt found with visual signs of malnutrition; subcutaneous fat loss: [moderate] Orbital fat pads region, [moderate] Ribs region. Muscle wasting: [severe] Temples region, [severe] Clavicle region [severe] Shoulder region/other (specify)

## 2018-06-20 NOTE — DIETITIAN INITIAL EVALUATION ADULT. - OTHER INFO
Nutrition Consult X Assessment. Pt 87 yo female with history of Dementia. Pt appears alert, but confused. No family @ bed side at time of visit. Per Pt her appetite poor lately; no report of chewing/swallowing difficulty. Pt not sure about het weight or any weight changes. Case discussed with nurse. Per nurse Pt ate well for lunch today. No report of nausea, vomiting or diarrhea @ this time. Pt appears thin with visual signs of fat & muscle wasting. RDN attempted to conduct physical assessment, but Pt declined. Pt may benefit from adding PO supplements to her diet rx. RDN remains available, nurse made aware.

## 2018-06-21 ENCOUNTER — APPOINTMENT (OUTPATIENT)
Age: 83
End: 2018-06-21

## 2018-06-21 ENCOUNTER — TRANSCRIPTION ENCOUNTER (OUTPATIENT)
Age: 83
End: 2018-06-21

## 2018-06-21 VITALS
DIASTOLIC BLOOD PRESSURE: 70 MMHG | SYSTOLIC BLOOD PRESSURE: 130 MMHG | RESPIRATION RATE: 15 BRPM | TEMPERATURE: 98.4 F | OXYGEN SATURATION: 94 % | HEART RATE: 83 BPM

## 2018-06-21 VITALS — WEIGHT: 120.81 LBS

## 2018-06-21 LAB
BUN SERPL-MCNC: 18 MG/DL — SIGNIFICANT CHANGE UP (ref 7–23)
CALCIUM SERPL-MCNC: 9 MG/DL — SIGNIFICANT CHANGE UP (ref 8.4–10.5)
CHLORIDE SERPL-SCNC: 107 MMOL/L — SIGNIFICANT CHANGE UP (ref 98–107)
CO2 SERPL-SCNC: 31 MMOL/L — SIGNIFICANT CHANGE UP (ref 22–31)
CREAT SERPL-MCNC: 0.76 MG/DL — SIGNIFICANT CHANGE UP (ref 0.5–1.3)
GLUCOSE SERPL-MCNC: 92 MG/DL — SIGNIFICANT CHANGE UP (ref 70–99)
HCT VFR BLD CALC: 33 % — LOW (ref 34.5–45)
HGB BLD-MCNC: 10.5 G/DL — LOW (ref 11.5–15.5)
INR BLD: 3 — HIGH (ref 0.88–1.17)
MAGNESIUM SERPL-MCNC: 1.9 MG/DL — SIGNIFICANT CHANGE UP (ref 1.6–2.6)
MCHC RBC-ENTMCNC: 29 PG — SIGNIFICANT CHANGE UP (ref 27–34)
MCHC RBC-ENTMCNC: 31.8 % — LOW (ref 32–36)
MCV RBC AUTO: 91.2 FL — SIGNIFICANT CHANGE UP (ref 80–100)
NRBC # FLD: 0 — SIGNIFICANT CHANGE UP
PHOSPHATE SERPL-MCNC: 3.1 MG/DL — SIGNIFICANT CHANGE UP (ref 2.5–4.5)
PLATELET # BLD AUTO: 193 K/UL — SIGNIFICANT CHANGE UP (ref 150–400)
PMV BLD: 10.2 FL — SIGNIFICANT CHANGE UP (ref 7–13)
POTASSIUM SERPL-MCNC: 4.2 MMOL/L — SIGNIFICANT CHANGE UP (ref 3.5–5.3)
POTASSIUM SERPL-SCNC: 4.2 MMOL/L — SIGNIFICANT CHANGE UP (ref 3.5–5.3)
PROTHROM AB SERPL-ACNC: 34 SEC — HIGH (ref 9.8–13.1)
RBC # BLD: 3.62 M/UL — LOW (ref 3.8–5.2)
RBC # FLD: 14.5 % — SIGNIFICANT CHANGE UP (ref 10.3–14.5)
SODIUM SERPL-SCNC: 146 MMOL/L — HIGH (ref 135–145)
WBC # BLD: 4.14 K/UL — SIGNIFICANT CHANGE UP (ref 3.8–10.5)
WBC # FLD AUTO: 4.14 K/UL — SIGNIFICANT CHANGE UP (ref 3.8–10.5)

## 2018-06-21 RX ORDER — FUROSEMIDE 40 MG
1 TABLET ORAL
Qty: 30 | Refills: 0 | OUTPATIENT
Start: 2018-06-21 | End: 2018-07-20

## 2018-06-21 RX ORDER — WARFARIN SODIUM 2.5 MG/1
1 TABLET ORAL
Qty: 30 | Refills: 0 | OUTPATIENT
Start: 2018-06-21 | End: 2018-07-20

## 2018-06-21 RX ORDER — WARFARIN SODIUM 2.5 MG/1
0.5 TABLET ORAL
Qty: 0 | Refills: 0 | COMMUNITY

## 2018-06-21 RX ORDER — QUETIAPINE FUMARATE 200 MG/1
0.5 TABLET, FILM COATED ORAL
Qty: 15 | Refills: 0 | OUTPATIENT
Start: 2018-06-21 | End: 2018-07-20

## 2018-06-21 RX ORDER — ACETAMINOPHEN 500 MG
2 TABLET ORAL
Qty: 0 | Refills: 0 | COMMUNITY
Start: 2018-06-21

## 2018-06-21 RX ORDER — POTASSIUM CHLORIDE 20 MEQ
1 PACKET (EA) ORAL
Qty: 14 | Refills: 0 | OUTPATIENT
Start: 2018-06-21 | End: 2018-07-04

## 2018-06-21 RX ORDER — ATENOLOL 25 MG/1
1 TABLET ORAL
Qty: 30 | Refills: 0 | OUTPATIENT
Start: 2018-06-21 | End: 2018-07-20

## 2018-06-21 RX ADMIN — Medication 112 MICROGRAM(S): at 05:12

## 2018-06-21 RX ADMIN — ATENOLOL 50 MILLIGRAM(S): 25 TABLET ORAL at 05:12

## 2018-06-21 RX ADMIN — PANTOPRAZOLE SODIUM 40 MILLIGRAM(S): 20 TABLET, DELAYED RELEASE ORAL at 05:12

## 2018-06-21 NOTE — DISCHARGE NOTE ADULT - HOME CARE AGENCY
[facility] , SOC 6/22/18, [facility] , services to reinstate 6/20 [facility] , SOC 6/22/18, [facility] , services to reinstate 6/21

## 2018-06-21 NOTE — DISCHARGE NOTE ADULT - CARE PLAN
Principal Discharge DX:	Sepsis  Goal:	remain free of infection  Assessment and plan of treatment:	Please follow up with your pcp within 1 week of discharge.  Please call to make an appointment within 1 week of discharge.    - May be secondary to PNA. Per ED attending, R lower consolidation noted on bedside US and pt presented with cough. RVP negative  - Will treat for PNA - cont Levaquin to complete 7 days  Secondary Diagnosis:	Weakness  Goal:	remain free of injury  Assessment and plan of treatment:	-PT - rehab, REFUSED, home with home PT  -abx completed   -resolving  Secondary Diagnosis:	Fall, initial encounter  Goal:	remain free of fall  Assessment and plan of treatment:	-Pt states she tripped on rug, no LOC or presyncopal symptoms  -Fall precautions  -PT - rehab, REFUSED, home with home PT  Secondary Diagnosis:	Right leg swelling  Goal:	continue lasix as prescribed  Assessment and plan of treatment:	-Doppler ordered, pt refusing   Please follow up with your pcp within 1 week of discharge.  Please call to make an appointment within 1 week of discharge. your lasix was decreased from 40mg twice a day and decreased daily  Secondary Diagnosis:	Hematuria, unspecified type  Goal:	resolved  Assessment and plan of treatment:	-Renal/ bladder US ordered, pt refusing  -F/U outpatient with Urology: [phone].  Please call to make an appointment within 1 week of discharge.  Secondary Diagnosis:	Hypothyroidism  Goal:	continue current regimen  Assessment and plan of treatment:	Please follow up with your pcp within 1 week of discharge.  Please call to make an appointment within 1 week of discharge.  Secondary Diagnosis:	Atrial fibrillation  Goal:	continue anticoagulation  Assessment and plan of treatment:	Please follow up with your pcp within 1 week of discharge.  Please call to make an appointment within 1 week of discharge. Your coumadin decreased from 1.5mg to 1mg . 6/20: InR 3.00 Principal Discharge DX:	Sepsis  Goal:	remain free of infection  Assessment and plan of treatment:	Please follow up with your pcp within 1 week of discharge.  Please call to make an appointment within 1 week of discharge.    - May be secondary to PNA. Per ED attending, R lower consolidation noted on bedside US and pt presented with cough. RVP negative  - Will treat for PNA - cont Levaquin to complete 7 days  Secondary Diagnosis:	Weakness  Goal:	remain free of injury  Assessment and plan of treatment:	-PT - rehab, REFUSED, home with home PT  -abx completed   -resolving  Secondary Diagnosis:	Fall, initial encounter  Goal:	remain free of fall  Assessment and plan of treatment:	-Pt states she tripped on rug, no LOC or presyncopal symptoms  -Fall precautions  -PT - rehab, REFUSED, home with home PT  Secondary Diagnosis:	Right leg swelling  Goal:	continue lasix as prescribed  Assessment and plan of treatment:	-Doppler ordered, pt refusing   Please follow up with your pcp within 1 week of discharge.  Please call to make an appointment within 1 week of discharge. your lasix was decreased from 40mg twice a day and decreased daily  Secondary Diagnosis:	Hematuria, unspecified type  Goal:	resolved  Assessment and plan of treatment:	-Renal/ bladder US ordered, pt refusing  -F/U outpatient with Urology: [phone].  Please call to make an appointment within 1 week of discharge.  Secondary Diagnosis:	Hypothyroidism  Goal:	continue current regimen  Assessment and plan of treatment:	Please follow up with your pcp within 1 week of discharge.  Please call to make an appointment within 1 week of discharge.  Secondary Diagnosis:	Atrial fibrillation  Goal:	continue anticoagulation  Assessment and plan of treatment:	Please follow up with your pcp within 1 week of discharge.  Please call to make an appointment within 1 week of discharge. Your coumadin decreased from 1.5mg to 1mg . 6/20: InR 3.00  Please check pt/inr level for coumadin dosing

## 2018-06-21 NOTE — DISCHARGE NOTE ADULT - MEDICATION SUMMARY - MEDICATIONS TO TAKE
I will START or STAY ON the medications listed below when I get home from the hospital:    acetaminophen 325 mg oral tablet  -- 2 tab(s) by mouth every 6 hours, As needed, For Temp greater than 38 C (100.4 F)  -- Indication: For Fever/pain prn     Coumadin 1 mg oral tablet  -- 1 tab(s) by mouth once a day   -- Do not take this drug if you are pregnant.  It is very important that you take or use this exactly as directed.  Do not skip doses or discontinue unless directed by your doctor.  Obtain medical advice before taking any non-prescription drugs as some may affect the action of this medication.    -- Indication: For afib     SEROquel 25 mg oral tablet  -- 0.5 tab(s) by mouth once a day at 6pm  -- Check with your doctor before becoming pregnant.  Do not drink alcoholic beverages when taking this medication.  May cause drowsiness.  Alcohol may intensify this effect.  Use care when operating dangerous machinery.  Obtain medical advice before taking any non-prescription drugs as some may affect the action of this medication.  This drug may impair the ability to drive or operate machinery.  Use care until you become familiar with its effects.    -- Indication: For agitation     atenolol 50 mg oral tablet  -- 1 tab(s) by mouth once a day  -- Indication: For Htn     furosemide 40 mg oral tablet  -- 1 tab(s) by mouth once a day   -- Indication: For LE swelling     potassium chloride 20 mEq oral tablet, extended release  -- 1 tab(s) by mouth once a day   -- It is very important that you take or use this exactly as directed.  Do not skip doses or discontinue unless directed by your doctor.  Medication should be taken with plenty of water.  Take with food or milk.    -- Indication: For Supplement     omeprazole 40 mg oral delayed release capsule  -- 1 cap(s) by mouth once a day  -- Indication: For gerd    levoFLOXacin 250 mg oral tablet  -- 1 tab(s) by mouth every 24 hours  -- Indication: For pna     Synthroid 112 mcg (0.112 mg) oral tablet  -- 1 tab(s) by mouth once a day  -- Indication: For Hypothyroidism

## 2018-06-21 NOTE — DISCHARGE NOTE ADULT - PATIENT PORTAL LINK FT
You can access the [facility] Patient Portal, offered by [facility], by registering with the following website: [email or web address]

## 2018-06-21 NOTE — CHART NOTE - NSCHARTNOTEFT_GEN_A_CORE
pt seen and examined. VSS. Lungs are clear diminished at the bases . Abdomen soft non-tender. Cardiac: S1,S2 irregular as pt has a history of afib. Pt medically stable for discharge.

## 2018-06-21 NOTE — DISCHARGE NOTE ADULT - SECONDARY DIAGNOSIS.
Weakness Fall, initial encounter Right leg swelling Hematuria, unspecified type Hypothyroidism Atrial fibrillation

## 2018-06-21 NOTE — DISCHARGE NOTE ADULT - HOSPITAL COURSE
88 F dementia (A+Ox2), A-fib (on Coumadin), hypothyroid, lung Ca S/P chemo/RT in remission, GERD sent in by home aid for poor PO intake, weakness and cough. Pt states she has no complaints. Endorses falling at home 2 days ago, tripping on carpet. CTH negative for acute pathology.       Weakness:  -In setting of poor PO intake and sepsis  -abx completed   -resolved     Sepsis  -Noted with R lower consolidation on bedside US in ED and endorses cough  -RVP negative   -CTX/Azithromycin; transitions to PO Levaquin.   -BCx, UCx negative     Fall   -Pt states she tripped on rug, no LOC or presyncopal symptoms  -Fall precautions  -PT - rehab, REFUSED, home with home PT     Right leg swelling  -Doppler ordered, pt refusing   -lasix resumed upon discharge     Hematuria  -Renal/ bladder US ordered, pt refusing  -F/U outpatient with Urology     Hypothyroidism  -Synthroid.    Chronic atrial fibrillation  -Coumadin  -Rate controlled.     Tachycardia  -Atenolol     Dementia  -Psych Cx  -Seroquel standing/ PRN  -RPR neg,folate 14.3 ; Vitamin b 12 834      Dispo: Home with home Care: Refused Rehab 88 F dementia (A+Ox2), A-fib (on Coumadin), hypothyroid, lung Ca S/P chemo/RT in remission, GERD sent in by home aid for poor PO intake, weakness and cough. Pt states she has no complaints. Endorses falling at home 2 days ago, tripping on carpet. CTH negative for acute pathology.     In the hospital pt was treated for sepsis secondary to pna and generalized weakness  Weakness:  -In setting of poor PO intake and sepsis  -abx completed   -resolved     Sepsis  -Noted with R lower consolidation on bedside US in ED and endorses cough  -RVP negative   -CTX/Azithromycin; transitions to PO Levaquin.   -BCx, UCx negative     Fall   -Pt states she tripped on rug, no LOC or presyncopal symptoms  -Fall precautions  -PT - rehab, REFUSED, home with home PT   Head ct done with no acute changes    Right leg swelling  -Doppler ordered but pt refused inr was therapeutic so low suspicion of dvt  -lasix resumed upon discharge     Hematuria  -Renal/ bladder US ordered, pt refusing  -F/U outpatient with Urology     Hypothyroidism  -Synthroid.    Chronic atrial fibrillation  -Coumadin  -Rate controlled.     Tachycardia  -Atenolol     Dementia  -Psych Cx  -Seroquel standing/ PRN  -RPR neg,folate 14.3 ; Vitamin b 12 834      Dispo: Home with home Care: Refused Rehab

## 2018-06-21 NOTE — DISCHARGE NOTE ADULT - PLAN OF CARE
remain free of infection Please follow up with your pcp within 1 week of discharge.  Please call to make an appointment within 1 week of discharge.    - May be secondary to PNA. Per ED attending, R lower consolidation noted on bedside US and pt presented with cough. RVP negative  - Will treat for PNA - cont Levaquin to complete 7 days remain free of injury -PT - rehab, REFUSED, home with home PT  -abx completed   -resolving remain free of fall -Pt states she tripped on rug, no LOC or presyncopal symptoms  -Fall precautions  -PT - rehab, REFUSED, home with home PT continue lasix as prescribed -Doppler ordered, pt refusing   Please follow up with your pcp within 1 week of discharge.  Please call to make an appointment within 1 week of discharge. your lasix was decreased from 40mg twice a day and decreased daily resolved -Renal/ bladder US ordered, pt refusing  -F/U outpatient with Urology: [phone].  Please call to make an appointment within 1 week of discharge. continue current regimen Please follow up with your pcp within 1 week of discharge.  Please call to make an appointment within 1 week of discharge. continue anticoagulation Please follow up with your pcp within 1 week of discharge.  Please call to make an appointment within 1 week of discharge. Your coumadin decreased from 1.5mg to 1mg . 6/20: InR 3.00 Please follow up with your pcp within 1 week of discharge.  Please call to make an appointment within 1 week of discharge. Your coumadin decreased from 1.5mg to 1mg . 6/20: InR 3.00  Please check pt/inr level for coumadin dosing

## 2018-06-21 NOTE — DISCHARGE NOTE ADULT - ADDITIONAL INSTRUCTIONS
Dementia:-Psych Cx  -Seroquel standing/ PRN: HOLD SEROQUEL FOR SEDATION  -RPR neg,folate 14.3 ; Vitamin b 12 834  Tachycardia:c/w atenolol  Echo as an outpt

## 2018-06-21 NOTE — DISCHARGE NOTE ADULT - MEDICATION SUMMARY - MEDICATIONS TO CHANGE
I will SWITCH the dose or number of times a day I take the medications listed below when I get home from the hospital:    Coumadin 3 mg oral tablet  -- 0.5 tab(s) by mouth once a day    furosemide 40 mg oral tablet  -- 1 tab(s) by mouth 2 times a day    potassium chloride 20 mEq oral tablet, extended release  -- 2 tab(s) by mouth once a day   -- It is very important that you take or use this exactly as directed.  Do not skip doses or discontinue unless directed by your doctor.  Medication should be taken with plenty of water.  Take with food or milk.

## 2018-06-22 ENCOUNTER — APPOINTMENT (OUTPATIENT)
Dept: HOME HEALTH SERVICES | Facility: HOME HEALTH | Age: 83
End: 2018-06-22

## 2018-06-22 LAB
INR PPP: 2.6 RATIO
PT BLD: 30 SEC

## 2018-07-02 ENCOUNTER — MEDICATION RENEWAL (OUTPATIENT)
Age: 83
End: 2018-07-02

## 2018-07-06 ENCOUNTER — APPOINTMENT (OUTPATIENT)
Dept: HOME HEALTH SERVICES | Facility: HOME HEALTH | Age: 83
End: 2018-07-06
Payer: COMMERCIAL

## 2018-07-06 VITALS
TEMPERATURE: 99.4 F | RESPIRATION RATE: 16 BRPM | SYSTOLIC BLOOD PRESSURE: 120 MMHG | HEART RATE: 94 BPM | DIASTOLIC BLOOD PRESSURE: 65 MMHG | OXYGEN SATURATION: 95 %

## 2018-07-06 DIAGNOSIS — I48.91 UNSPECIFIED ATRIAL FIBRILLATION: ICD-10-CM

## 2018-07-06 DIAGNOSIS — Z86.19 PERSONAL HISTORY OF OTHER INFECTIOUS AND PARASITIC DISEASES: ICD-10-CM

## 2018-07-06 DIAGNOSIS — M79.646 PAIN IN UNSPECIFIED FINGER(S): ICD-10-CM

## 2018-07-06 DIAGNOSIS — F02.81 OTHER ALZHEIMER'S DISEASE: ICD-10-CM

## 2018-07-06 DIAGNOSIS — L60.3 NAIL DYSTROPHY: ICD-10-CM

## 2018-07-06 DIAGNOSIS — Z87.01 PERSONAL HISTORY OF PNEUMONIA (RECURRENT): ICD-10-CM

## 2018-07-06 DIAGNOSIS — R45.1 RESTLESSNESS AND AGITATION: ICD-10-CM

## 2018-07-06 DIAGNOSIS — I73.9 PERIPHERAL VASCULAR DISEASE, UNSPECIFIED: ICD-10-CM

## 2018-07-06 DIAGNOSIS — I87.2 VENOUS INSUFFICIENCY (CHRONIC) (PERIPHERAL): ICD-10-CM

## 2018-07-06 DIAGNOSIS — I50.9 HEART FAILURE, UNSPECIFIED: ICD-10-CM

## 2018-07-06 DIAGNOSIS — Z86.39 PERSONAL HISTORY OF OTHER ENDOCRINE, NUTRITIONAL AND METABOLIC DISEASE: ICD-10-CM

## 2018-07-06 DIAGNOSIS — J45.20 MILD INTERMITTENT ASTHMA, UNCOMPLICATED: ICD-10-CM

## 2018-07-06 DIAGNOSIS — K21.0 GASTRO-ESOPHAGEAL REFLUX DISEASE WITH ESOPHAGITIS: ICD-10-CM

## 2018-07-06 DIAGNOSIS — G30.8 OTHER ALZHEIMER'S DISEASE: ICD-10-CM

## 2018-07-06 PROCEDURE — 99350 HOME/RES VST EST HIGH MDM 60: CPT

## 2018-07-06 RX ORDER — LEVOFLOXACIN 250 MG/1
250 TABLET, FILM COATED ORAL DAILY
Qty: 3 | Refills: 0 | Status: COMPLETED | COMMUNITY
Start: 2018-06-21 | End: 2018-07-06

## 2018-07-06 RX ORDER — QUETIAPINE FUMARATE 50 MG/1
50 TABLET ORAL
Qty: 60 | Refills: 5 | Status: COMPLETED | COMMUNITY
Start: 2018-05-31 | End: 2018-07-06

## 2018-07-07 PROBLEM — L60.3 DYSTROPHIC NAIL: Status: RESOLVED | Noted: 2018-04-17 | Resolved: 2018-07-07

## 2018-07-07 PROBLEM — M79.646 PAIN IN FINGER: Status: RESOLVED | Noted: 2018-05-08 | Resolved: 2018-07-07

## 2018-07-07 PROBLEM — Z87.01 HISTORY OF PNEUMONIA: Status: RESOLVED | Noted: 2018-06-16 | Resolved: 2018-07-07

## 2018-07-07 PROBLEM — I87.2 VENOUS STASIS DERMATITIS OF BOTH LOWER EXTREMITIES: Status: ACTIVE | Noted: 2017-09-30

## 2018-07-07 PROBLEM — R45.1 AGITATION: Status: RESOLVED | Noted: 2018-05-21 | Resolved: 2018-07-07

## 2018-07-07 RX ORDER — TEMAZEPAM 7.5 MG/1
7.5 CAPSULE ORAL
Qty: 30 | Refills: 0 | Status: COMPLETED | COMMUNITY
Start: 2018-06-05 | End: 2018-07-07

## 2018-07-12 ENCOUNTER — MEDICATION RENEWAL (OUTPATIENT)
Age: 83
End: 2018-07-12

## 2018-07-12 LAB
INR PPP: 2.39 RATIO
PT BLD: 27.5 SEC
TSH SERPL-ACNC: 0.59 UIU/ML
VIT B12 SERPL-MCNC: 616 PG/ML

## 2018-08-01 ENCOUNTER — MEDICATION RENEWAL (OUTPATIENT)
Age: 83
End: 2018-08-01

## 2018-08-08 LAB
INR PPP: 2.11 RATIO
PT BLD: 24.2 SEC

## 2018-08-13 ENCOUNTER — APPOINTMENT (OUTPATIENT)
Dept: HOME HEALTH SERVICES | Facility: HOME HEALTH | Age: 83
End: 2018-08-13

## 2018-08-13 VITALS
RESPIRATION RATE: 16 BRPM | OXYGEN SATURATION: 94 % | HEART RATE: 88 BPM | DIASTOLIC BLOOD PRESSURE: 72 MMHG | TEMPERATURE: 98.6 F | SYSTOLIC BLOOD PRESSURE: 124 MMHG

## 2018-08-14 RX ORDER — POTASSIUM CHLORIDE 1500 MG/1
20 TABLET, FILM COATED, EXTENDED RELEASE ORAL
Qty: 90 | Refills: 3 | Status: ACTIVE | COMMUNITY
Start: 2018-05-03

## 2018-08-14 RX ORDER — LORAZEPAM 2 MG/ML
2 SOLUTION, CONCENTRATE ORAL
Qty: 1 | Refills: 0 | Status: ACTIVE | COMMUNITY
Start: 2018-07-02

## 2018-08-14 RX ORDER — ALBUTEROL SULFATE 2.5 MG/3ML
(2.5 MG/3ML) SOLUTION RESPIRATORY (INHALATION)
Qty: 1 | Refills: 2 | Status: ACTIVE | COMMUNITY
Start: 2018-07-06

## 2018-08-14 RX ORDER — WARFARIN 1 MG/1
1 TABLET ORAL DAILY
Qty: 30 | Refills: 3 | Status: ACTIVE | COMMUNITY
Start: 2018-06-21

## 2018-08-14 RX ORDER — ALBUTEROL SULFATE 90 UG/1
108 (90 BASE) AEROSOL, METERED RESPIRATORY (INHALATION)
Qty: 1 | Refills: 4 | Status: ACTIVE | COMMUNITY
Start: 2018-02-12

## 2018-08-14 RX ORDER — FUROSEMIDE 40 MG/1
40 TABLET ORAL DAILY
Qty: 90 | Refills: 2 | Status: ACTIVE | COMMUNITY
Start: 2018-06-21

## 2018-08-14 RX ORDER — DOXEPIN HYDROCHLORIDE 25 MG/1
25 CAPSULE ORAL
Qty: 30 | Refills: 5 | Status: ACTIVE | COMMUNITY
Start: 2018-05-30

## 2018-08-15 ENCOUNTER — MEDICATION RENEWAL (OUTPATIENT)
Age: 83
End: 2018-08-15

## 2018-08-15 RX ORDER — FUROSEMIDE 20 MG/1
20 TABLET ORAL DAILY
Qty: 30 | Refills: 5 | Status: ACTIVE | COMMUNITY
Start: 2018-08-15 | End: 1900-01-01

## 2018-08-22 ENCOUNTER — RX RENEWAL (OUTPATIENT)
Age: 83
End: 2018-08-22

## 2018-08-22 RX ORDER — QUETIAPINE FUMARATE 25 MG/1
25 TABLET ORAL TWICE DAILY
Qty: 60 | Refills: 0 | Status: ACTIVE | COMMUNITY
Start: 2018-05-21 | End: 1900-01-01

## 2019-02-13 NOTE — PROGRESS NOTE ADULT - PROBLEM/PLAN-8
Osteoarthritis  Osteoarthritis (also called degenerative joint disease) happens when the cartilage in a joint becomes damaged and worn. This may be due to age, wear and tear, overuse of the joint, or other problems. Osteoarthritis can affect any joint. But it is most common in hands, knees, spine, hips, and feet. Symptoms include joint stiffness, pain, and swelling.  Home care  · When a joint is more sore than usual, rest it for a day or two.  · Heat can help relieve stiffness. Take a hot bath or apply a heating pad for up to 30 minutes at a time. If symptoms are worse in the morning, using heat just after awakening can help relax the muscle and soothe the joints.   · Ice helps relieve pain and swelling. It is often used after activity. Use a cold pack wrapped in a thin cloth on the joint for 10-15 minutes at a time.   · Alternating hot and cold can also help relieve pain. Try this for 20 minutes at a time, several times per day.  · Exercise helps prevent the muscles and ligaments around the joint from becoming weak. It also helps maintain function in the joint.  Be as active as you can. Talk to your doctor about what activity program is best for you.  · Excess weight puts a lot of extra strain on weight-bearing joints of the lower back, hips, knees, feet and ankles. If you are overweight, talk to your doctor about a safe and effective weight loss program.  · Use anti-inflammatory medications as prescribed for pain. This incudes acetaminophen or NSAIDs such as ibuprofen or naproxen. If needed, topical or injected medications may be recommended. Talk to your doctor if these options are not enough to manage your pain.  · Talk with your doctor about devices that might help improve your function and reduce pain.  Follow-up care  Follow up with your doctor as advised by our staff.  When to seek medical attention  Call your doctor right away if any of the following occur:  · Redness or swelling of a painful 
joint  · Discharge or pus from a painful joint  · Fever of 100.4ºF (38ºC) or higher, or as directed by your healthcare provider  · Worsening joint pain  · Decreased ability to move the joint or bear weight on the joint  © 4047-4132 "[employer] [address] [place] [id number]. All rights reserved. This information is not intended as a substitute for professional medical care. Always follow your healthcare professional's instructions.        
DISPLAY PLAN FREE TEXT
DISPLAY PLAN FREE TEXT

## 2021-11-13 NOTE — ED ADULT NURSE NOTE - ASSIST WITH
Noted--lw--see other notes--I have ordered chest x-ray testing and stopped her ACE inhibitor and started a PPI walking/standing

## 2022-03-20 NOTE — ED PROVIDER NOTE - NS ED MD TWO NIGHTS YN
Constitutional:  No fevers or chills.  Eyes:  No visual changes, eye pain, or discharge.  ENT:  No hearing changes. No sore throat. (+) clear rhinorrhea  Neck:  No neck pain or stiffness.  Cardiac:  No CP or edema.  Resp:  No cough or SOB. No hemoptysis.   GI:  No nausea, vomiting, diarrhea, or abdominal pain.  :  No dysuria, frequency, or hematuria.  MSK:  No myalgias or joint pain/swelling.  Neuro:  No headache, dizziness, or weakness.  Skin:  No skin rash. Yes

## 2022-07-14 NOTE — BEHAVIORAL HEALTH ASSESSMENT NOTE - THOUGHT CONTENT
Follow up with Dr. Martinez in 3 months    Start flomax daily    Start keflex daily x 2 weeks   
Unremarkable

## 2022-11-28 NOTE — PATIENT PROFILE ADULT. - FUNCTIONAL LEVEL PRIOR: DRESSING
(0) independent [Cessation strategies including cessation program discussed] : Cessation strategies including cessation program discussed [Use of nicotine replacement therapies and other medications discussed] : Use of nicotine replacement therapies and other medications discussed [Yes] : Willing to quit smoking [ - Annual Lung Cancer Screening/Share Decision Making Discussion] : Annual Lung Cancer Screening/Share Decision Making Discussion. (I have advised this patient to have a Low Dose CT (LDCT) scan of the lungs and have discussed the following with the patient in a shared decision making discussion:   Benefits of Detection and Early Treatment: There is adequate evidence that annual screening for lung cancer with LDCT in a population of high-risk persons can prevent a substantial number of lung cancer–related deaths. The magnitude of benefit depends on the individual patient's risk for lung cancer, as those who are at highest risk are most likely to benefit. Screening cannot prevent most lung cancer–related deaths, and does not replace smoking cessation. Harms of Detection and Early Intervention and Treatment: The harms associated with LDCT screening include false-negative and false-positive results, incidental findings, over diagnosis, and radiation exposure. False-positive LDCT results occur in a substantial proportion of screened persons; 95% of all positive results do not lead to a diagnosis of cancer. In a high-quality screening program, further imaging can resolve most false-positive results; however, some patients may require invasive procedures. Radiation harms, including cancer resulting from cumulative exposure to radiation, vary depending on the age at the start of screening; the number of scans received; and the person's exposure to other sources of radiation, particularly other medical imaging.) [FreeTextEntry1] : 10 [FreeTextEntry3] : 12

## 2024-01-31 NOTE — DISCHARGE NOTE ADULT - HOSPITAL COURSE
Debbie Magill, MSW, LCSW     88W PMH mild dementia, chronic sinusitis with PND, afib on Coumadin, CHF on lasix, Hypothyroidism, constipation, insomnia, lung neoplasm 2008 s/p chemo/radiotherapy in remission, GERD presenting with difficulty breathing x1. Pt found to have Sepsis 2/2 PNA.     Sepsis  -(Tachypnea, Fever, tachycardia) likely due to left retrocardiac PNA  -Viral panel negative  -c/w ceftriaxone and azithromycin swiched levaquin to complete 7 days   -Blood and Urine Cx: NTD  -CXR:Cardiomegaly with vascular congestion.      Chronic atrial fibrillation  -will c/w coumadin,   -INR subtherapeutic likely due to non-compliance  -Trend INR.     Hypothyroidism  -c/w synthroid.     Essential hypertension  -BP elevated prior to ED arrival, improved in the ED  -c/w atenolol  -Trend BP  -Low salt diet.     Chronic congestive heart failure  - unclear is systolic or diastolic  -TTE as an outpt   -c/w Lasix.     Chronic sinusitis  -c/w flonase.    Chronic bronchitis  -pt non-compliant with spiriva  -c/w nebs prn and spiriva.     PT recommended Rehab: Pt and her son who is her health care proxy: refused rehab   Dispo: Home with Home Care/Home PT